# Patient Record
Sex: MALE | Race: WHITE | NOT HISPANIC OR LATINO
[De-identification: names, ages, dates, MRNs, and addresses within clinical notes are randomized per-mention and may not be internally consistent; named-entity substitution may affect disease eponyms.]

---

## 2017-06-13 ENCOUNTER — TRANSCRIPTION ENCOUNTER (OUTPATIENT)
Age: 52
End: 2017-06-13

## 2018-09-26 ENCOUNTER — EMERGENCY (EMERGENCY)
Facility: HOSPITAL | Age: 53
LOS: 0 days | Discharge: HOME | End: 2018-09-26
Attending: EMERGENCY MEDICINE | Admitting: EMERGENCY MEDICINE
Payer: COMMERCIAL

## 2018-09-26 VITALS — HEIGHT: 67 IN | WEIGHT: 149.91 LBS

## 2018-09-26 VITALS
SYSTOLIC BLOOD PRESSURE: 127 MMHG | OXYGEN SATURATION: 98 % | HEART RATE: 64 BPM | RESPIRATION RATE: 18 BRPM | TEMPERATURE: 98 F | DIASTOLIC BLOOD PRESSURE: 79 MMHG

## 2018-09-26 LAB
ALBUMIN SERPL ELPH-MCNC: 4.4 G/DL — SIGNIFICANT CHANGE UP (ref 3.3–5)
ALP SERPL-CCNC: 116 U/L — SIGNIFICANT CHANGE UP (ref 40–120)
ALT FLD-CCNC: 37 U/L — SIGNIFICANT CHANGE UP (ref 12–78)
ANION GAP SERPL CALC-SCNC: 7 MMOL/L — SIGNIFICANT CHANGE UP (ref 5–17)
AST SERPL-CCNC: 25 U/L — SIGNIFICANT CHANGE UP (ref 15–37)
BASOPHILS # BLD AUTO: 0.02 K/UL — SIGNIFICANT CHANGE UP (ref 0–0.2)
BASOPHILS NFR BLD AUTO: 0.4 % — SIGNIFICANT CHANGE UP (ref 0–2)
BILIRUB SERPL-MCNC: 0.5 MG/DL — SIGNIFICANT CHANGE UP (ref 0.2–1.2)
BUN SERPL-MCNC: 26 MG/DL — HIGH (ref 7–23)
CALCIUM SERPL-MCNC: 8.8 MG/DL — SIGNIFICANT CHANGE UP (ref 8.5–10.1)
CHLORIDE SERPL-SCNC: 107 MMOL/L — SIGNIFICANT CHANGE UP (ref 96–108)
CO2 SERPL-SCNC: 25 MMOL/L — SIGNIFICANT CHANGE UP (ref 22–31)
CREAT SERPL-MCNC: 0.94 MG/DL — SIGNIFICANT CHANGE UP (ref 0.5–1.3)
EOSINOPHIL # BLD AUTO: 0.24 K/UL — SIGNIFICANT CHANGE UP (ref 0–0.5)
EOSINOPHIL NFR BLD AUTO: 4.6 % — SIGNIFICANT CHANGE UP (ref 0–6)
GLUCOSE SERPL-MCNC: 98 MG/DL — SIGNIFICANT CHANGE UP (ref 70–99)
HCT VFR BLD CALC: 44.2 % — SIGNIFICANT CHANGE UP (ref 39–50)
HGB BLD-MCNC: 15.6 G/DL — SIGNIFICANT CHANGE UP (ref 13–17)
IMM GRANULOCYTES NFR BLD AUTO: 0.6 % — SIGNIFICANT CHANGE UP (ref 0–1.5)
LYMPHOCYTES # BLD AUTO: 1.49 K/UL — SIGNIFICANT CHANGE UP (ref 1–3.3)
LYMPHOCYTES # BLD AUTO: 28.6 % — SIGNIFICANT CHANGE UP (ref 13–44)
MAGNESIUM SERPL-MCNC: 2.1 MG/DL — SIGNIFICANT CHANGE UP (ref 1.6–2.6)
MCHC RBC-ENTMCNC: 30.2 PG — SIGNIFICANT CHANGE UP (ref 27–34)
MCHC RBC-ENTMCNC: 35.3 GM/DL — SIGNIFICANT CHANGE UP (ref 32–36)
MCV RBC AUTO: 85.7 FL — SIGNIFICANT CHANGE UP (ref 80–100)
MONOCYTES # BLD AUTO: 0.52 K/UL — SIGNIFICANT CHANGE UP (ref 0–0.9)
MONOCYTES NFR BLD AUTO: 10 % — SIGNIFICANT CHANGE UP (ref 2–14)
NEUTROPHILS # BLD AUTO: 2.91 K/UL — SIGNIFICANT CHANGE UP (ref 1.8–7.4)
NEUTROPHILS NFR BLD AUTO: 55.8 % — SIGNIFICANT CHANGE UP (ref 43–77)
NRBC # BLD: 0 /100 WBCS — SIGNIFICANT CHANGE UP (ref 0–0)
PLATELET # BLD AUTO: 246 K/UL — SIGNIFICANT CHANGE UP (ref 150–400)
POTASSIUM SERPL-MCNC: 4 MMOL/L — SIGNIFICANT CHANGE UP (ref 3.5–5.3)
POTASSIUM SERPL-SCNC: 4 MMOL/L — SIGNIFICANT CHANGE UP (ref 3.5–5.3)
PROT SERPL-MCNC: 8.2 GM/DL — SIGNIFICANT CHANGE UP (ref 6–8.3)
RBC # BLD: 5.16 M/UL — SIGNIFICANT CHANGE UP (ref 4.2–5.8)
RBC # FLD: 11.9 % — SIGNIFICANT CHANGE UP (ref 10.3–14.5)
SODIUM SERPL-SCNC: 139 MMOL/L — SIGNIFICANT CHANGE UP (ref 135–145)
TROPONIN I SERPL-MCNC: <0.015 NG/ML — SIGNIFICANT CHANGE UP (ref 0.01–0.04)
WBC # BLD: 5.21 K/UL — SIGNIFICANT CHANGE UP (ref 3.8–10.5)
WBC # FLD AUTO: 5.21 K/UL — SIGNIFICANT CHANGE UP (ref 3.8–10.5)

## 2018-09-26 PROCEDURE — 71046 X-RAY EXAM CHEST 2 VIEWS: CPT | Mod: 26

## 2018-09-26 PROCEDURE — 99285 EMERGENCY DEPT VISIT HI MDM: CPT

## 2018-09-26 PROCEDURE — 93010 ELECTROCARDIOGRAM REPORT: CPT

## 2018-09-26 RX ORDER — SODIUM CHLORIDE 9 MG/ML
1000 INJECTION INTRAMUSCULAR; INTRAVENOUS; SUBCUTANEOUS ONCE
Qty: 0 | Refills: 0 | Status: COMPLETED | OUTPATIENT
Start: 2018-09-26 | End: 2018-09-26

## 2018-09-26 RX ORDER — ASPIRIN/CALCIUM CARB/MAGNESIUM 324 MG
325 TABLET ORAL ONCE
Qty: 0 | Refills: 0 | Status: COMPLETED | OUTPATIENT
Start: 2018-09-26 | End: 2018-09-26

## 2018-09-26 RX ADMIN — SODIUM CHLORIDE 1000 MILLILITER(S): 9 INJECTION INTRAMUSCULAR; INTRAVENOUS; SUBCUTANEOUS at 13:07

## 2018-09-26 RX ADMIN — SODIUM CHLORIDE 1000 MILLILITER(S): 9 INJECTION INTRAMUSCULAR; INTRAVENOUS; SUBCUTANEOUS at 12:07

## 2018-09-26 RX ADMIN — Medication 325 MILLIGRAM(S): at 11:10

## 2018-09-26 NOTE — ED STATDOCS - PROGRESS NOTE DETAILS
52 y/o male presents to the ED c/o left sided tight CP and left arm pain starting at 4am today. Pt states the chest tightness lasted all morning but the pain was for 30 minutes. +SOB that pt's wife states has been going on for a while.  Sx slightly improved. Pt also c/o a rash at the bottom of his right foot that has been going on for a while. Pt reports hx of prior sx "a few times". Denies fever or chills. Pt has not seen PCP in a while. FMhx of heart dz. heart score 3. PE: cardiac: s1-s2 RRR, lungs: CTAB plan: 2 set ce and ekg cxr and reeval. -Nina Noriega PA-C pt reeval and offers no complaints aware of imaigng and lab results awaiting 2nd ce. -Nina Noriega PA-C pt aware of all labs results advised to fu with cardiology and pmd and return to ed if needed, pt agrees with plan. -Nina Noriega PA-C

## 2018-09-26 NOTE — ED ADULT TRIAGE NOTE - CHIEF COMPLAINT QUOTE
Patient comes to ED for chest pain, dizziness and left arm pain since this morning at 330am. pt reports symptoms on and off for 2 years but pain worsened this morning

## 2018-09-26 NOTE — ED ADULT NURSE NOTE - OBJECTIVE STATEMENT
Pt presents to ED c/o CP this morning up until an hour ago with left arm numbness. Pt reports SOB at baseline. Pt reports chest tightness. Pt denies N/V

## 2018-09-26 NOTE — ED ADULT NURSE NOTE - NSIMPLEMENTINTERV_GEN_ALL_ED
Implemented All Universal Safety Interventions:  [place] to call system. Call bell, personal items and telephone within reach. Instruct patient to call for assistance. Room bathroom lighting operational. Non-slip footwear when patient is off stretcher. Physically safe environment: no spills, clutter or unnecessary equipment. Stretcher in lowest position, wheels locked, appropriate side rails in place.

## 2018-09-26 NOTE — ED STATDOCS - OBJECTIVE STATEMENT
52 y/o male presents to the ED c/o left sided tight CP and left arm pain starting at 4am today. Pt states the chest tightness lasted all morning but the pain was for 30 minutes. +SOB that pt's wife states has been going on for a while.  Sx slightly improved. Pt also c/o a rash at the bottom of his right foot that has been going on for a while. Pt reports hx of prior sx "a few times". Denies fever or chills. Pt has not seen PCP in a while. FMhx of heart dz.

## 2018-09-26 NOTE — ED STATDOCS - MEDICAL DECISION MAKING DETAILS
HEART score 3.  EKG nonischemic.  CXR clear.  Troponin x 2 undetectable.  No concerning features for PE, aortic dissection, or any other thoracic emergencies.  Pt feeling better.  Okay for d/c home.  F/u with PCP.

## 2018-09-27 DIAGNOSIS — Z88.0 ALLERGY STATUS TO PENICILLIN: ICD-10-CM

## 2018-09-27 DIAGNOSIS — R07.9 CHEST PAIN, UNSPECIFIED: ICD-10-CM

## 2018-10-01 ENCOUNTER — APPOINTMENT (OUTPATIENT)
Dept: INTERNAL MEDICINE | Facility: CLINIC | Age: 53
End: 2018-10-01
Payer: COMMERCIAL

## 2018-10-01 ENCOUNTER — NON-APPOINTMENT (OUTPATIENT)
Age: 53
End: 2018-10-01

## 2018-10-01 VITALS
WEIGHT: 162.99 LBS | RESPIRATION RATE: 18 BRPM | HEIGHT: 68 IN | BODY MASS INDEX: 24.7 KG/M2 | SYSTOLIC BLOOD PRESSURE: 116 MMHG | HEART RATE: 68 BPM | OXYGEN SATURATION: 97 % | DIASTOLIC BLOOD PRESSURE: 86 MMHG | TEMPERATURE: 98.3 F

## 2018-10-01 PROCEDURE — G0442 ANNUAL ALCOHOL SCREEN 15 MIN: CPT

## 2018-10-01 PROCEDURE — 94010 BREATHING CAPACITY TEST: CPT

## 2018-10-01 PROCEDURE — 99204 OFFICE O/P NEW MOD 45 MIN: CPT | Mod: 25

## 2018-10-01 NOTE — HISTORY OF PRESENT ILLNESS
[FreeTextEntry8] : ER follow up appointment\par \par \par A 53-year-old male with a negative past medical history, was seen recently in the emergency room with chest pain he noted upon waking up. it last about 15 minutes and then he may have had other episodes while he was in the emergency room. He was evaluated there with EKGs labs and chest x-ray which were okay and he was released and is scheduled to see Dr. Boo cardiology for an evaluation. He has no history of prior heart disease. He is not having any more chest pain. He denies palpitations or syncope. He does notice ongoing dyspnea on exertion and he has noted for at least 2 years. He is able to walk 6 blocks and climb one flight of stairs fine. Is not having any coughing, wheezing, sputum production, or hemoptysis. No fever or chills.

## 2018-10-01 NOTE — PHYSICAL EXAM
[No Acute Distress] : no acute distress [Well Nourished] : well nourished [Well Developed] : well developed [Well-Appearing] : well-appearing [Normal Sclera/Conjunctiva] : normal sclera/conjunctiva [PERRL] : pupils equal round and reactive to light [Normal Outer Ear/Nose] : the outer ears and nose were normal in appearance [Normal Oropharynx] : the oropharynx was normal [No JVD] : no jugular venous distention [Supple] : supple [No Lymphadenopathy] : no lymphadenopathy [Thyroid Normal, No Nodules] : the thyroid was normal and there were no nodules present [No Respiratory Distress] : no respiratory distress  [Clear to Auscultation] : lungs were clear to auscultation bilaterally [No Accessory Muscle Use] : no accessory muscle use [Normal Rate] : normal rate  [Regular Rhythm] : with a regular rhythm [Normal S1, S2] : normal S1 and S2 [No Edema] : there was no peripheral edema [No Extremity Clubbing/Cyanosis] : no extremity clubbing/cyanosis [Soft] : abdomen soft [Non Tender] : non-tender [Non-distended] : non-distended [No Masses] : no abdominal mass palpated [No HSM] : no HSM [Normal Bowel Sounds] : normal bowel sounds [Normal Posterior Cervical Nodes] : no posterior cervical lymphadenopathy [Normal Anterior Cervical Nodes] : no anterior cervical lymphadenopathy [Normal Affect] : the affect was normal [Normal Insight/Judgement] : insight and judgment were intact [de-identified] : alert

## 2018-10-01 NOTE — REVIEW OF SYSTEMS
[Fever] : no fever [Chills] : no chills [Chest Pain] : no chest pain [Palpitations] : no palpitations [Wheezing] : no wheezing [Cough] : no cough [Negative] : Heme/Lymph [FreeTextEntry1] : no h.o. DM, thyroid disease, cancer, lymphoma.

## 2018-10-01 NOTE — ASSESSMENT
[FreeTextEntry1] : #1 recent atypical chest pain. Patient denies further episodes since recent ER visit. Will be seeing Dr. Boo, cardiologist, soon for evaluation.\par \par #2 long history of dyspnea on exertion.  patient is able to walk 6 blocks and climb one flight of stairs without symptoms. Spirometry today is normal. Chest x-ray in emergency room was normal. For future full PFT's. \par \par #3 allergic rhinitis. Patient takes Claritin p.r.n.\par \par Return visit in 2 months for CPE. Patient will call or return at anytime, if he is having symptoms or clinical change.\par \par

## 2018-10-01 NOTE — HEALTH RISK ASSESSMENT
[Very Good] : ~his/her~  mood as very good [No falls in past year] : Patient reported no falls in the past year [0] : 2) Feeling down, depressed, or hopeless: Not at all (0) [[id number]] : 0 [Change in mental status noted] : Change in mental status noted [None] : None [# of Members in Household ___] :  household currently consist of [unfilled] member(s) [Employed] : employed [] :  [# Of Children ___] : has [unfilled] children [Sexually Active] : sexually active [Feels Safe at Home] : Feels safe at home [Reports changes in hearing] : Reports no changes in hearing [Reports changes in vision] : Reports no changes in vision [Smoke Detector] : smoke detector [Carbon Monoxide Detector] : carbon monoxide detector [Guns at Home] : no guns at home [Safety elements used in home] : safety elements used in home [Seat Belt] :  uses seat belt [Sunscreen] : uses sunscreen [FreeTextEntry2] :  [] : Yes [de-identified] : occas

## 2018-10-01 NOTE — PHYSICAL EXAM
[No Acute Distress] : no acute distress [Well Nourished] : well nourished [Well Developed] : well developed [Well-Appearing] : well-appearing [Normal Sclera/Conjunctiva] : normal sclera/conjunctiva [PERRL] : pupils equal round and reactive to light [Normal Outer Ear/Nose] : the outer ears and nose were normal in appearance [Normal Oropharynx] : the oropharynx was normal [No JVD] : no jugular venous distention [Supple] : supple [No Lymphadenopathy] : no lymphadenopathy [Thyroid Normal, No Nodules] : the thyroid was normal and there were no nodules present [No Respiratory Distress] : no respiratory distress  [Clear to Auscultation] : lungs were clear to auscultation bilaterally [No Accessory Muscle Use] : no accessory muscle use [Normal Rate] : normal rate  [Regular Rhythm] : with a regular rhythm [Normal S1, S2] : normal S1 and S2 [No Edema] : there was no peripheral edema [No Extremity Clubbing/Cyanosis] : no extremity clubbing/cyanosis [Soft] : abdomen soft [Non Tender] : non-tender [Non-distended] : non-distended [No Masses] : no abdominal mass palpated [No HSM] : no HSM [Normal Bowel Sounds] : normal bowel sounds [Normal Posterior Cervical Nodes] : no posterior cervical lymphadenopathy [Normal Anterior Cervical Nodes] : no anterior cervical lymphadenopathy [Normal Affect] : the affect was normal [Normal Insight/Judgement] : insight and judgment were intact [de-identified] : alert

## 2018-10-01 NOTE — HEALTH RISK ASSESSMENT
[Very Good] : ~his/her~  mood as very good [No falls in past year] : Patient reported no falls in the past year [0] : 2) Feeling down, depressed, or hopeless: Not at all (0) [[id number]] : 0 [Change in mental status noted] : Change in mental status noted [None] : None [# of Members in Household ___] :  household currently consist of [unfilled] member(s) [Employed] : employed [] :  [# Of Children ___] : has [unfilled] children [Sexually Active] : sexually active [Feels Safe at Home] : Feels safe at home [Reports changes in hearing] : Reports no changes in hearing [Reports changes in vision] : Reports no changes in vision [Smoke Detector] : smoke detector [Carbon Monoxide Detector] : carbon monoxide detector [Guns at Home] : no guns at home [Safety elements used in home] : safety elements used in home [Seat Belt] :  uses seat belt [Sunscreen] : uses sunscreen [FreeTextEntry2] :  [] : Yes [de-identified] : occas

## 2018-10-02 ENCOUNTER — APPOINTMENT (OUTPATIENT)
Dept: CARDIOLOGY | Facility: CLINIC | Age: 53
End: 2018-10-02

## 2018-10-05 ENCOUNTER — APPOINTMENT (OUTPATIENT)
Dept: CARDIOLOGY | Facility: CLINIC | Age: 53
End: 2018-10-05
Payer: COMMERCIAL

## 2018-10-05 ENCOUNTER — NON-APPOINTMENT (OUTPATIENT)
Age: 53
End: 2018-10-05

## 2018-10-05 VITALS
OXYGEN SATURATION: 98 % | BODY MASS INDEX: 25.16 KG/M2 | WEIGHT: 166 LBS | HEART RATE: 72 BPM | SYSTOLIC BLOOD PRESSURE: 128 MMHG | DIASTOLIC BLOOD PRESSURE: 80 MMHG | HEIGHT: 68 IN

## 2018-10-05 PROCEDURE — 36415 COLL VENOUS BLD VENIPUNCTURE: CPT

## 2018-10-05 PROCEDURE — 93000 ELECTROCARDIOGRAM COMPLETE: CPT

## 2018-10-05 PROCEDURE — 99205 OFFICE O/P NEW HI 60 MIN: CPT | Mod: 25

## 2018-10-06 LAB
25(OH)D3 SERPL-MCNC: 29.1 NG/ML
ALBUMIN SERPL ELPH-MCNC: 5.1 G/DL
ALP BLD-CCNC: 103 U/L
ALT SERPL-CCNC: 29 U/L
ANION GAP SERPL CALC-SCNC: 13 MMOL/L
AST SERPL-CCNC: 29 U/L
BASOPHILS # BLD AUTO: 0.01 K/UL
BASOPHILS NFR BLD AUTO: 0.2 %
BILIRUB SERPL-MCNC: 0.7 MG/DL
BUN SERPL-MCNC: 20 MG/DL
CALCIUM SERPL-MCNC: 9.6 MG/DL
CHLORIDE SERPL-SCNC: 104 MMOL/L
CHOLEST SERPL-MCNC: 157 MG/DL
CHOLEST/HDLC SERPL: 3.1 RATIO
CO2 SERPL-SCNC: 25 MMOL/L
CREAT SERPL-MCNC: 0.81 MG/DL
EOSINOPHIL # BLD AUTO: 0.25 K/UL
EOSINOPHIL NFR BLD AUTO: 4.3 %
GLUCOSE SERPL-MCNC: 92 MG/DL
HBA1C MFR BLD HPLC: 5.5 %
HCT VFR BLD CALC: 42.1 %
HDLC SERPL-MCNC: 51 MG/DL
HGB BLD-MCNC: 14.1 G/DL
IMM GRANULOCYTES NFR BLD AUTO: 0.2 %
LDLC SERPL CALC-MCNC: 87 MG/DL
LYMPHOCYTES # BLD AUTO: 1.65 K/UL
LYMPHOCYTES NFR BLD AUTO: 28.5 %
MAN DIFF?: NORMAL
MCHC RBC-ENTMCNC: 29.3 PG
MCHC RBC-ENTMCNC: 33.5 GM/DL
MCV RBC AUTO: 87.5 FL
MONOCYTES # BLD AUTO: 0.64 K/UL
MONOCYTES NFR BLD AUTO: 11.1 %
NEUTROPHILS # BLD AUTO: 3.23 K/UL
NEUTROPHILS NFR BLD AUTO: 55.7 %
PLATELET # BLD AUTO: 237 K/UL
POTASSIUM SERPL-SCNC: 4.9 MMOL/L
PROT SERPL-MCNC: 7.5 G/DL
PSA SERPL-MCNC: 0.89 NG/ML
RBC # BLD: 4.81 M/UL
RBC # FLD: 13 %
SODIUM SERPL-SCNC: 142 MMOL/L
TRIGL SERPL-MCNC: 96 MG/DL
TSH SERPL-ACNC: 1.04 UIU/ML
WBC # FLD AUTO: 5.79 K/UL

## 2018-10-15 ENCOUNTER — APPOINTMENT (OUTPATIENT)
Dept: CARDIOLOGY | Facility: CLINIC | Age: 53
End: 2018-10-15
Payer: COMMERCIAL

## 2018-10-15 PROCEDURE — 93015 CV STRESS TEST SUPVJ I&R: CPT

## 2018-10-15 PROCEDURE — 93306 TTE W/DOPPLER COMPLETE: CPT

## 2018-11-05 ENCOUNTER — APPOINTMENT (OUTPATIENT)
Dept: CARDIOLOGY | Facility: CLINIC | Age: 53
End: 2018-11-05
Payer: COMMERCIAL

## 2018-11-05 VITALS
OXYGEN SATURATION: 94 % | BODY MASS INDEX: 25.31 KG/M2 | HEIGHT: 68 IN | DIASTOLIC BLOOD PRESSURE: 76 MMHG | WEIGHT: 167 LBS | HEART RATE: 72 BPM | SYSTOLIC BLOOD PRESSURE: 115 MMHG

## 2018-11-05 PROCEDURE — 93000 ELECTROCARDIOGRAM COMPLETE: CPT

## 2018-11-05 PROCEDURE — 99214 OFFICE O/P EST MOD 30 MIN: CPT | Mod: 25

## 2018-11-05 RX ORDER — ASPIRIN 81 MG
81 TABLET, DELAYED RELEASE (ENTERIC COATED) ORAL DAILY
Refills: 2 | Status: ACTIVE | COMMUNITY

## 2018-11-05 NOTE — HISTORY OF PRESENT ILLNESS
[FreeTextEntry1] : 54 yo male presents after evaluation in ED for chest pain or shortness of breath with exertion. Pt was recently evaluated by Pulmonary/IM. Testing was reviewed. 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

## 2018-11-05 NOTE — DISCUSSION/SUMMARY
[Patient] : the patient [___ Month(s)] : [unfilled] month(s) [FreeTextEntry1] : Testing was reviwed. Bicuspid valve was discussed with patient. Explaination of proboble eventual repair was discussed.

## 2018-11-05 NOTE — PHYSICAL EXAM
[General Appearance - Well Developed] : well developed [Normal Appearance] : normal appearance [Well Groomed] : well groomed [General Appearance - Well Nourished] : well nourished [No Deformities] : no deformities [General Appearance - In No Acute Distress] : no acute distress [Normal Conjunctiva] : the conjunctiva exhibited no abnormalities [Eyelids - No Xanthelasma] : the eyelids demonstrated no xanthelasmas [Normal Oral Mucosa] : normal oral mucosa [No Oral Pallor] : no oral pallor [No Oral Cyanosis] : no oral cyanosis [Normal Jugular Venous A Waves Present] : normal jugular venous A waves present [Normal Jugular Venous V Waves Present] : normal jugular venous V waves present [No Jugular Venous Tran A Waves] : no jugular venous tran A waves [Respiration, Rhythm And Depth] : normal respiratory rhythm and effort [Exaggerated Use Of Accessory Muscles For Inspiration] : no accessory muscle use [Auscultation Breath Sounds / Voice Sounds] : lungs were clear to auscultation bilaterally [Heart Rate And Rhythm] : heart rate and rhythm were normal [Heart Sounds] : normal S1 and S2 [Murmurs] : no murmurs present [Abdomen Soft] : soft [Abdomen Tenderness] : non-tender [Abdomen Mass (___ Cm)] : no abdominal mass palpated [Abnormal Walk] : normal gait [Gait - Sufficient For Exercise Testing] : the gait was sufficient for exercise testing [Nail Clubbing] : no clubbing of the fingernails [Cyanosis, Localized] : no localized cyanosis [Petechial Hemorrhages (___cm)] : no petechial hemorrhages [Skin Color & Pigmentation] : normal skin color and pigmentation [] : no rash [No Venous Stasis] : no venous stasis [Skin Lesions] : no skin lesions [No Skin Ulcers] : no skin ulcer [No Xanthoma] : no  xanthoma was observed [Oriented To Time, Place, And Person] : oriented to person, place, and time [Affect] : the affect was normal [Mood] : the mood was normal [No Anxiety] : not feeling anxious

## 2018-12-10 ENCOUNTER — APPOINTMENT (OUTPATIENT)
Dept: INTERNAL MEDICINE | Facility: CLINIC | Age: 53
End: 2018-12-10
Payer: COMMERCIAL

## 2018-12-10 VITALS
BODY MASS INDEX: 25.01 KG/M2 | HEIGHT: 68 IN | HEART RATE: 70 BPM | DIASTOLIC BLOOD PRESSURE: 84 MMHG | SYSTOLIC BLOOD PRESSURE: 132 MMHG | OXYGEN SATURATION: 98 % | WEIGHT: 165 LBS | RESPIRATION RATE: 18 BRPM | TEMPERATURE: 98.4 F

## 2018-12-10 DIAGNOSIS — M54.31 SCIATICA, RIGHT SIDE: ICD-10-CM

## 2018-12-10 PROCEDURE — ZZZZZ: CPT

## 2018-12-10 PROCEDURE — 94060 EVALUATION OF WHEEZING: CPT

## 2018-12-10 PROCEDURE — G0442 ANNUAL ALCOHOL SCREEN 15 MIN: CPT

## 2018-12-10 PROCEDURE — 94727 GAS DIL/WSHOT DETER LNG VOL: CPT

## 2018-12-10 PROCEDURE — 99214 OFFICE O/P EST MOD 30 MIN: CPT | Mod: 25

## 2018-12-10 PROCEDURE — 94729 DIFFUSING CAPACITY: CPT

## 2018-12-10 NOTE — HISTORY OF PRESENT ILLNESS
[FreeTextEntry1] : RV, dyspnea [de-identified] : This is a 53-year-old  who returns for her following appointment. Has exposure to diesel fumes. He is not having coughing, wheezing, sputum production, hemoptysis. He does notice variable dyspnea. He was able to walk 4 miles recently without stopping or shortness of breath. Sometimes when he bends down he does notice some shortness. Has not had been hospitalized for breath. He is a nonsmoker. He has no history of asthma or COPD. he is not having fever or chills.\par \par He does have some right back and right lower extremity pain. This can be sharp or sometimes a numbness sensation.\par \par Is not having any chest pain, palpitations, or syncope.

## 2018-12-10 NOTE — HEALTH RISK ASSESSMENT
[Smoke Detector] : smoke detector [Carbon Monoxide Detector] : carbon monoxide detector [Safety elements used in home] : safety elements used in home [Seat Belt] :  uses seat belt [Sunscreen] : uses sunscreen [] : No [Guns at Home] : no guns at home

## 2018-12-10 NOTE — COUNSELING
[Healthy eating counseling provided] : healthy eating [Low Fat Diet] : Low fat diet [ - Annual Alcohol Misuse Screening] : Annual Alcohol Misuse Screening

## 2018-12-10 NOTE — REVIEW OF SYSTEMS
[Chills] : no chills [Chest Pain] : no chest pain [Palpitations] : no palpitations [Wheezing] : no wheezing [Cough] : no cough [Abdominal Pain] : no abdominal pain [Nausea] : no nausea [Vomiting] : no vomiting [Dizziness] : no dizziness [Fainting] : no fainting [FreeTextEntry9] : see above

## 2018-12-10 NOTE — ASSESSMENT
[FreeTextEntry1] : #1  53 year-old male with history of variable dyspnea.  He does note this over the last 2-1/2 years. His spirometry and chest x-ray in the past have been normal. he will have a full pulmonary function testing today (WNL).  I recommended CT scan of chest without contrast to evaluate for possible early interstitial lung disease.\par \par #2 low vit D level.  Vit D 1000 u/d. recheck in 6-12 months.\par \par #3 h.o. RLE sciatica type pain. careful lifting and bending. low back exercises.

## 2018-12-10 NOTE — HISTORY OF PRESENT ILLNESS
[FreeTextEntry1] : RV, dyspnea [de-identified] : This is a 53-year-old  who returns for her following appointment. Has exposure to diesel fumes. He is not having coughing, wheezing, sputum production, hemoptysis. He does notice variable dyspnea. He was able to walk 4 miles recently without stopping or shortness of breath. Sometimes when he bends down he does notice some shortness. Has not had been hospitalized for breath. He is a nonsmoker. He has no history of asthma or COPD. he is not having fever or chills.\par \par He does have some right back and right lower extremity pain. This can be sharp or sometimes a numbness sensation.\par \par Is not having any chest pain, palpitations, or syncope.

## 2018-12-10 NOTE — PHYSICAL EXAM
[No Acute Distress] : no acute distress [Well Nourished] : well nourished [Well Developed] : well developed [Well-Appearing] : well-appearing [Normal Sclera/Conjunctiva] : normal sclera/conjunctiva [PERRL] : pupils equal round and reactive to light [Normal Outer Ear/Nose] : the outer ears and nose were normal in appearance [Normal Oropharynx] : the oropharynx was normal [No JVD] : no jugular venous distention [Supple] : supple [No Lymphadenopathy] : no lymphadenopathy [Thyroid Normal, No Nodules] : the thyroid was normal and there were no nodules present [No Respiratory Distress] : no respiratory distress  [Clear to Auscultation] : lungs were clear to auscultation bilaterally [No Accessory Muscle Use] : no accessory muscle use [Normal Rate] : normal rate  [Regular Rhythm] : with a regular rhythm [Normal S1, S2] : normal S1 and S2 [No Murmur] : no murmur heard [No Edema] : there was no peripheral edema [No Extremity Clubbing/Cyanosis] : no extremity clubbing/cyanosis [Soft] : abdomen soft [Non Tender] : non-tender [Non-distended] : non-distended [No HSM] : no HSM [Normal Bowel Sounds] : normal bowel sounds [Normal Anterior Cervical Nodes] : no anterior cervical lymphadenopathy [No Rash] : no rash [No Focal Deficits] : no focal deficits [Normal Affect] : the affect was normal [Normal Insight/Judgement] : insight and judgment were intact [de-identified] : neg SLR bilat [de-identified] : 5/5 ankle flex and ext.  touch gr nl distal LE's.

## 2018-12-10 NOTE — PHYSICAL EXAM
[No Acute Distress] : no acute distress [Well Nourished] : well nourished [Well Developed] : well developed [Well-Appearing] : well-appearing [Normal Sclera/Conjunctiva] : normal sclera/conjunctiva [PERRL] : pupils equal round and reactive to light [Normal Outer Ear/Nose] : the outer ears and nose were normal in appearance [Normal Oropharynx] : the oropharynx was normal [No JVD] : no jugular venous distention [Supple] : supple [No Lymphadenopathy] : no lymphadenopathy [Thyroid Normal, No Nodules] : the thyroid was normal and there were no nodules present [No Respiratory Distress] : no respiratory distress  [Clear to Auscultation] : lungs were clear to auscultation bilaterally [No Accessory Muscle Use] : no accessory muscle use [Normal Rate] : normal rate  [Regular Rhythm] : with a regular rhythm [Normal S1, S2] : normal S1 and S2 [No Murmur] : no murmur heard [No Edema] : there was no peripheral edema [No Extremity Clubbing/Cyanosis] : no extremity clubbing/cyanosis [Soft] : abdomen soft [Non Tender] : non-tender [Non-distended] : non-distended [No HSM] : no HSM [Normal Bowel Sounds] : normal bowel sounds [Normal Anterior Cervical Nodes] : no anterior cervical lymphadenopathy [No Rash] : no rash [No Focal Deficits] : no focal deficits [Normal Affect] : the affect was normal [Normal Insight/Judgement] : insight and judgment were intact [de-identified] : neg SLR bilat [de-identified] : 5/5 ankle flex and ext.  touch gr nl distal LE's.

## 2019-04-07 ENCOUNTER — FORM ENCOUNTER (OUTPATIENT)
Age: 54
End: 2019-04-07

## 2019-04-08 ENCOUNTER — OUTPATIENT (OUTPATIENT)
Dept: OUTPATIENT SERVICES | Facility: HOSPITAL | Age: 54
LOS: 1 days | End: 2019-04-08
Payer: COMMERCIAL

## 2019-04-08 ENCOUNTER — APPOINTMENT (OUTPATIENT)
Dept: CT IMAGING | Facility: CLINIC | Age: 54
End: 2019-04-08
Payer: COMMERCIAL

## 2019-04-08 DIAGNOSIS — Z00.8 ENCOUNTER FOR OTHER GENERAL EXAMINATION: ICD-10-CM

## 2019-04-08 PROCEDURE — 71250 CT THORAX DX C-: CPT

## 2019-04-08 PROCEDURE — 71250 CT THORAX DX C-: CPT | Mod: 26

## 2019-11-04 ENCOUNTER — APPOINTMENT (OUTPATIENT)
Dept: CARDIOLOGY | Facility: CLINIC | Age: 54
End: 2019-11-04

## 2020-04-02 ENCOUNTER — TRANSCRIPTION ENCOUNTER (OUTPATIENT)
Age: 55
End: 2020-04-02

## 2020-11-06 ENCOUNTER — APPOINTMENT (OUTPATIENT)
Dept: INTERNAL MEDICINE | Facility: CLINIC | Age: 55
End: 2020-11-06
Payer: COMMERCIAL

## 2020-11-06 VITALS
WEIGHT: 160 LBS | SYSTOLIC BLOOD PRESSURE: 120 MMHG | DIASTOLIC BLOOD PRESSURE: 82 MMHG | RESPIRATION RATE: 18 BRPM | OXYGEN SATURATION: 99 % | HEART RATE: 78 BPM | BODY MASS INDEX: 24.25 KG/M2 | TEMPERATURE: 98.3 F | HEIGHT: 68 IN

## 2020-11-06 DIAGNOSIS — Z77.098 CONTACT WITH AND (SUSPECTED) EXPOSURE TO OTHER HAZARDOUS, CHIEFLY NONMEDICINAL, CHEMICALS: ICD-10-CM

## 2020-11-06 PROCEDURE — 99072 ADDL SUPL MATRL&STAF TM PHE: CPT

## 2020-11-06 PROCEDURE — 99214 OFFICE O/P EST MOD 30 MIN: CPT | Mod: 25

## 2020-11-06 NOTE — HISTORY OF PRESENT ILLNESS
[FreeTextEntry1] : RV [de-identified] : This is a retrun visit for this 55-year-old male who continues to note to have variable dyspnea.  He is not having any coughing, wheezing, or hemoptysis.  No fever or chills.  He is a non-smoker has no history of asthma. \par \par He tells me he will be seeing his cardiologist next week and does need a referral for this.

## 2020-11-06 NOTE — REVIEW OF SYSTEMS
[Fever] : no fever [Chills] : no chills [Wheezing] : no wheezing [Cough] : no cough [Abdominal Pain] : no abdominal pain [Vomiting] : no vomiting [Negative] : Heme/Lymph [FreeTextEntry6] : see above

## 2020-11-06 NOTE — PHYSICAL EXAM
[No Acute Distress] : no acute distress [Well Nourished] : well nourished [Well Developed] : well developed [Well-Appearing] : well-appearing [Normal Sclera/Conjunctiva] : normal sclera/conjunctiva [Normal Outer Ear/Nose] : the outer ears and nose were normal in appearance [Normal Oropharynx] : the oropharynx was normal [No JVD] : no jugular venous distention [No Lymphadenopathy] : no lymphadenopathy [Supple] : supple [No Respiratory Distress] : no respiratory distress  [No Accessory Muscle Use] : no accessory muscle use [Clear to Auscultation] : lungs were clear to auscultation bilaterally [Normal Rate] : normal rate  [Regular Rhythm] : with a regular rhythm [Normal S1, S2] : normal S1 and S2 [No Edema] : there was no peripheral edema [No Extremity Clubbing/Cyanosis] : no extremity clubbing/cyanosis [Soft] : abdomen soft [Non Tender] : non-tender [Non-distended] : non-distended [No HSM] : no HSM [Normal Bowel Sounds] : normal bowel sounds [Normal Anterior Cervical Nodes] : no anterior cervical lymphadenopathy [No Rash] : no rash [Coordination Grossly Intact] : coordination grossly intact [No Focal Deficits] : no focal deficits [Normal Gait] : normal gait [Normal Affect] : the affect was normal [Normal Insight/Judgement] : insight and judgment were intact

## 2020-11-06 NOTE — ASSESSMENT
[FreeTextEntry1] : #1  Continues to have occasional episodes of dyspnea.  This seems to be variable.  He will be following with his cardiologist, Dr. Boo next week.\par \par #2 HM.  Pt refusing to have influenza vaccination.

## 2020-11-15 NOTE — ED ADULT NURSE NOTE - NSFALLRSKOUTCOME_ED_ALL_ED
JODI DUBOSE    PLV 1EAS 106 D1    Patient is a 69y old  Male who presents with a chief complaint of ALTERED MENTAL STATUS  AND LACTICEMIA (15 Nov 2020 07:59)       Allergies    No Known Allergies    Intolerances        HPI:  70 y/o Male  from [facility] with medical  hx of Afib, HTN ,HLD ,DM ,Paranoid personality disorder, Alzheimer's dementia, sent in to ED  for combative behaviour and AMS x 1 day.  Patient was tested  +Covid yesterday 11/09 .  Pt is apparently AAOx3 at baseline.  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 Pt wants the person responsible for sending him to the ED to be fired. Pt denies any SOB, cough, GI/ symptoms. Covid test came back negative but patient found to have lactate serum elevated 3.1 ,iv hydration given Admitted for septic workup and evaluation,send blood and urine cx,serial lactate levels,monitor vitals closley,ivfs hydration,monitor urine output and renal profile,id and pulm consults called to determine need in antibacterial tx Patient developed severe agitation and combativeness in er later ,requiring  several ativan ,haldol injections .Placed on constant observation CT BRAIN -NAD .PSYCHIATRY AND NEUROLOGY consults requested .Palliative care consult requested ,to discuss advance directives and complete MOLST  (10 Nov 2020 18:10)      PAST MEDICAL & SURGICAL HISTORY:  Paranoid personality disorder    Anxiety    Dementia    Alzheimer disease    Hypertension    Hyperlipidemia    Diabetes mellitus    Afib    DM (diabetes mellitus)    DM (diabetes mellitus)        FAMILY HISTORY:        MEDICATIONS   acetaminophen   Tablet .. 650 milliGRAM(s) Oral every 6 hours PRN  aspirin 325 milliGRAM(s) Oral daily  atorvastatin 40 milliGRAM(s) Oral at bedtime  dextrose 40% Gel 15 Gram(s) Oral once  dextrose 5%. 1000 milliLiter(s) IV Continuous <Continuous>  dextrose 5%. 1000 milliLiter(s) IV Continuous <Continuous>  dextrose 50% Injectable 25 Gram(s) IV Push once  dextrose 50% Injectable 12.5 Gram(s) IV Push once  dextrose 50% Injectable 25 Gram(s) IV Push once  diltiazem    milliGRAM(s) Oral daily  donepezil 10 milliGRAM(s) Oral at bedtime  famotidine    Tablet 20 milliGRAM(s) Oral daily  glucagon  Injectable 1 milliGRAM(s) IntraMuscular once  heparin   Injectable 5000 Unit(s) SubCutaneous every 12 hours  insulin lispro (ADMELOG) corrective regimen sliding scale   SubCutaneous three times a day before meals  loratadine 10 milliGRAM(s) Oral daily  LORazepam   Injectable 0.5 milliGRAM(s) IV Push every 6 hours PRN  melatonin 5 milliGRAM(s) Oral at bedtime PRN  memantine 5 milliGRAM(s) Oral two times a day  metoprolol tartrate 25 milliGRAM(s) Oral two times a day  OLANZapine Disintegrating Tablet 10 milliGRAM(s) Oral three times a day  OLANZapine Injectable 5 milliGRAM(s) IntraMuscular every 6 hours PRN      Vital Signs Last 24 Hrs  T(C): 36.5 (15 Nov 2020 05:23), Max: 37.3 (14 Nov 2020 20:00)  T(F): 97.7 (15 Nov 2020 05:23), Max: 99.1 (14 Nov 2020 20:00)  HR: 107 (15 Nov 2020 05:23) (71 - 107)  BP: 128/86 (15 Nov 2020 05:23) (128/86 - 156/69)  BP(mean): --  RR: 17 (15 Nov 2020 05:23) (16 - 17)  SpO2: 96% (15 Nov 2020 05:23) (95% - 96%)            LABS:                        12.3   6.89  )-----------( 172      ( 14 Nov 2020 09:34 )             34.9     11-14    144  |  112<H>  |  8   ----------------------------<  172<H>  3.2<L>   |  25  |  0.51    Ca    8.2<L>      14 Nov 2020 09:34                WBC:  WBC Count: 6.89 K/uL (11-14 @ 09:34)  WBC Count: 4.94 K/uL (11-12 @ 08:50)      MICROBIOLOGY:  RECENT CULTURES:  11-12 .Blood Blood XXXX XXXX   No growth to date.    11-10 .Urine Clean Catch (Midstream) XXXX XXXX   No growth    11-10 .Blood Blood-Peripheral Blood Culture PCR   Growth in anaerobic bottle: Gram Positive Cocci in Clusters   Growth in anaerobic bottle: Staphylococcus hominis  Coag Negative Staphylococcus  Single set isolate, possible contaminant. Contact  Microbiology if susceptibility testing clinically  indicated.  "Due to technical problems, Proteus sp. will Not be reported as part of  the BCID panel until further notice"  ***Blood Panel PCR results on this specimen are available  approximately 3 hours after the Gram stain result.***  Gram stain, PCR, and/or culture results may not always  correspond due to difference in methodologies.  ************************************************************  This PCR assay was performed using [employer].  The following targets are tested for: Enterococcus,  vancomycin resistant enterococci, Listeria monocytogenes,  coagulase negative staphylococci, S. aureus,  methicillin resistant S. aureus, Streptococcus agalactiae  (Group B), S. pneumoniae, S. pyogenes (Group A),  Acinetobacter baumannii, Enterobacter cloacae, E. coli,  Klebsiella oxytoca, K. pneumoniae, Proteus sp.,  Serratia marcescens, Haemophilus influenzae,  Neisseria meningitidis, Pseudomonas aeruginosa, Candida  albicans, C. glabrata, C krusei, C parapsilosis,  C. tropicalis and the KPC resistance gene.                    Sodium:  Sodium, Serum: 144 mmol/L (11-14 @ 09:34)  Sodium, Serum: 144 mmol/L (11-12 @ 08:50)      0.51 mg/dL 11-14 @ 09:34  0.65 mg/dL 11-12 @ 08:50      Hemoglobin:  Hemoglobin: 12.3 g/dL (11-14 @ 09:34)  Hemoglobin: 12.1 g/dL (11-12 @ 08:50)      Platelets: Platelet Count - Automated: 172 K/uL (11-14 @ 09:34)  Platelet Count - Automated: 178 K/uL (11-12 @ 08:50)              RADIOLOGY & ADDITIONAL STUDIES:   Universal Safety Interventions

## 2020-11-16 ENCOUNTER — APPOINTMENT (OUTPATIENT)
Dept: CARDIOLOGY | Facility: CLINIC | Age: 55
End: 2020-11-16
Payer: COMMERCIAL

## 2020-11-16 ENCOUNTER — NON-APPOINTMENT (OUTPATIENT)
Age: 55
End: 2020-11-16

## 2020-11-16 VITALS
HEIGHT: 68 IN | WEIGHT: 163 LBS | DIASTOLIC BLOOD PRESSURE: 79 MMHG | BODY MASS INDEX: 24.71 KG/M2 | HEART RATE: 69 BPM | OXYGEN SATURATION: 100 % | SYSTOLIC BLOOD PRESSURE: 141 MMHG

## 2020-11-16 DIAGNOSIS — I35.9 NONRHEUMATIC AORTIC VALVE DISORDER, UNSPECIFIED: ICD-10-CM

## 2020-11-16 DIAGNOSIS — Z87.898 PERSONAL HISTORY OF OTHER SPECIFIED CONDITIONS: ICD-10-CM

## 2020-11-16 DIAGNOSIS — R06.00 DYSPNEA, UNSPECIFIED: ICD-10-CM

## 2020-11-16 PROCEDURE — 99072 ADDL SUPL MATRL&STAF TM PHE: CPT

## 2020-11-16 PROCEDURE — 93000 ELECTROCARDIOGRAM COMPLETE: CPT

## 2020-11-16 PROCEDURE — 99215 OFFICE O/P EST HI 40 MIN: CPT

## 2020-11-16 RX ORDER — MULTIVIT-MIN/FOLIC/VIT K/LYCOP 400-300MCG
50 MCG TABLET ORAL
Qty: 90 | Refills: 3 | Status: ACTIVE | COMMUNITY

## 2020-11-16 NOTE — HISTORY OF PRESENT ILLNESS
[FreeTextEntry1] : 56 yo male presents for ANDUJAR. Pt has had worsening ANDUJAR according to his wife. Pt reports occasional chest pressure. Pt is ambulating for exercise. Pt denies palpitations.

## 2020-11-16 NOTE — REASON FOR VISIT
[Follow-Up - Clinic] : a clinic follow-up of [Aortic Stenosis] : aortic stenosis [Chest Pain] : chest pain [Dyspnea] : dyspnea

## 2020-11-16 NOTE — DISCUSSION/SUMMARY
[FreeTextEntry1] : ANDUJAR may be multifactorial however given his Echo evidence of a bicuspid AV we will do an Echo.

## 2020-11-23 LAB
25(OH)D3 SERPL-MCNC: 43.5 NG/ML
ALBUMIN SERPL ELPH-MCNC: 4.8 G/DL
ALP BLD-CCNC: 98 U/L
ALT SERPL-CCNC: 25 U/L
ANION GAP SERPL CALC-SCNC: 10 MMOL/L
APPEARANCE: CLEAR
AST SERPL-CCNC: 25 U/L
BACTERIA: NEGATIVE
BASOPHILS # BLD AUTO: 0.03 K/UL
BASOPHILS NFR BLD AUTO: 0.6 %
BILIRUB SERPL-MCNC: 0.8 MG/DL
BILIRUBIN URINE: NEGATIVE
BLOOD URINE: NEGATIVE
BUN SERPL-MCNC: 22 MG/DL
CALCIUM SERPL-MCNC: 9.3 MG/DL
CHLORIDE SERPL-SCNC: 104 MMOL/L
CHOLEST SERPL-MCNC: 169 MG/DL
CO2 SERPL-SCNC: 26 MMOL/L
COLOR: YELLOW
CREAT SERPL-MCNC: 0.93 MG/DL
EOSINOPHIL # BLD AUTO: 0.15 K/UL
EOSINOPHIL NFR BLD AUTO: 3.1 %
ESTIMATED AVERAGE GLUCOSE: 105 MG/DL
GLUCOSE QUALITATIVE U: NEGATIVE
GLUCOSE SERPL-MCNC: 94 MG/DL
HBA1C MFR BLD HPLC: 5.3 %
HCT VFR BLD CALC: 43.5 %
HDLC SERPL-MCNC: 55 MG/DL
HGB BLD-MCNC: 14.5 G/DL
HYALINE CASTS: 0 /LPF
IMM GRANULOCYTES NFR BLD AUTO: 0.2 %
KETONES URINE: NEGATIVE
LDLC SERPL CALC-MCNC: 104 MG/DL
LEUKOCYTE ESTERASE URINE: NEGATIVE
LYMPHOCYTES # BLD AUTO: 1.49 K/UL
LYMPHOCYTES NFR BLD AUTO: 30.8 %
MAN DIFF?: NORMAL
MCHC RBC-ENTMCNC: 29.8 PG
MCHC RBC-ENTMCNC: 33.3 GM/DL
MCV RBC AUTO: 89.5 FL
MICROSCOPIC-UA: NORMAL
MONOCYTES # BLD AUTO: 0.55 K/UL
MONOCYTES NFR BLD AUTO: 11.4 %
NEUTROPHILS # BLD AUTO: 2.61 K/UL
NEUTROPHILS NFR BLD AUTO: 53.9 %
NITRITE URINE: NEGATIVE
NONHDLC SERPL-MCNC: 114 MG/DL
PH URINE: 6
PLATELET # BLD AUTO: 235 K/UL
POTASSIUM SERPL-SCNC: 4.6 MMOL/L
PROT SERPL-MCNC: 7.1 G/DL
PROTEIN URINE: NORMAL
PSA SERPL-MCNC: 1.02 NG/ML
RBC # BLD: 4.86 M/UL
RBC # FLD: 12.2 %
RED BLOOD CELLS URINE: 2 /HPF
SODIUM SERPL-SCNC: 139 MMOL/L
SPECIFIC GRAVITY URINE: 1.03
SQUAMOUS EPITHELIAL CELLS: 0 /HPF
TRIGL SERPL-MCNC: 49 MG/DL
TSH SERPL-ACNC: 0.73 UIU/ML
UROBILINOGEN URINE: NORMAL
WBC # FLD AUTO: 4.84 K/UL
WHITE BLOOD CELLS URINE: 1 /HPF

## 2020-12-28 ENCOUNTER — APPOINTMENT (OUTPATIENT)
Dept: CARDIOLOGY | Facility: CLINIC | Age: 55
End: 2020-12-28
Payer: COMMERCIAL

## 2020-12-28 PROCEDURE — 93306 TTE W/DOPPLER COMPLETE: CPT

## 2021-03-19 ENCOUNTER — NON-APPOINTMENT (OUTPATIENT)
Age: 56
End: 2021-03-19

## 2021-03-19 ENCOUNTER — APPOINTMENT (OUTPATIENT)
Dept: INTERNAL MEDICINE | Facility: CLINIC | Age: 56
End: 2021-03-19
Payer: COMMERCIAL

## 2021-03-19 VITALS
HEART RATE: 80 BPM | SYSTOLIC BLOOD PRESSURE: 130 MMHG | HEIGHT: 68 IN | BODY MASS INDEX: 23.19 KG/M2 | DIASTOLIC BLOOD PRESSURE: 88 MMHG | TEMPERATURE: 98 F | OXYGEN SATURATION: 97 % | WEIGHT: 153 LBS

## 2021-03-19 DIAGNOSIS — R21 RASH AND OTHER NONSPECIFIC SKIN ERUPTION: ICD-10-CM

## 2021-03-19 DIAGNOSIS — B35.4 TINEA CORPORIS: ICD-10-CM

## 2021-03-19 DIAGNOSIS — L30.9 DERMATITIS, UNSPECIFIED: ICD-10-CM

## 2021-03-19 PROCEDURE — 99214 OFFICE O/P EST MOD 30 MIN: CPT

## 2021-03-19 PROCEDURE — 99072 ADDL SUPL MATRL&STAF TM PHE: CPT

## 2021-03-19 NOTE — REVIEW OF SYSTEMS
[Skin Rash] : skin rash [Negative] : Psychiatric [Fever] : no fever [Chills] : no chills [Fatigue] : no fatigue [Itching] : no itching [de-identified] : see HPI

## 2021-03-19 NOTE — HISTORY OF PRESENT ILLNESS
[FreeTextEntry8] : Pt presents  to the office today with complaints of scaly reddened rash on chest and upper back. He has no prior h/ of skin conditions. He noticed  it 10 days ago, is not itchy, sometimes burns. Extends to both sides of his upper chest and upper back. Some areas are circular. He denies any new products. \par He does not take any medications. No fever, chills, joint swelling, chest pain, SOB. \par

## 2021-03-19 NOTE — PHYSICAL EXAM
[No Acute Distress] : no acute distress [Well Nourished] : well nourished [Well Developed] : well developed [No Respiratory Distress] : no respiratory distress  [No Accessory Muscle Use] : no accessory muscle use [Clear to Auscultation] : lungs were clear to auscultation bilaterally [Normal Rate] : normal rate  [Regular Rhythm] : with a regular rhythm [Normal S1, S2] : normal S1 and S2 [Coordination Grossly Intact] : coordination grossly intact [No Focal Deficits] : no focal deficits [Normal Gait] : normal gait [Normal Affect] : the affect was normal [Alert and Oriented x3] : oriented to person, place, and time [Normal Insight/Judgement] : insight and judgment were intact [de-identified] : scaly round patches on upper chest , back

## 2021-11-04 ENCOUNTER — RESULT REVIEW (OUTPATIENT)
Age: 56
End: 2021-11-04

## 2021-11-04 ENCOUNTER — INPATIENT (INPATIENT)
Facility: HOSPITAL | Age: 56
LOS: 0 days | Discharge: HOME | DRG: 352 | End: 2021-11-04
Attending: SURGERY | Admitting: SURGERY
Payer: COMMERCIAL

## 2021-11-04 ENCOUNTER — TRANSCRIPTION ENCOUNTER (OUTPATIENT)
Age: 56
End: 2021-11-04

## 2021-11-04 VITALS
HEART RATE: 62 BPM | OXYGEN SATURATION: 100 % | RESPIRATION RATE: 16 BRPM | SYSTOLIC BLOOD PRESSURE: 126 MMHG | DIASTOLIC BLOOD PRESSURE: 74 MMHG | TEMPERATURE: 97 F

## 2021-11-04 VITALS — WEIGHT: 149.91 LBS | HEIGHT: 67 IN

## 2021-11-04 DIAGNOSIS — Z78.9 OTHER SPECIFIED HEALTH STATUS: Chronic | ICD-10-CM

## 2021-11-04 DIAGNOSIS — K40.90 UNILATERAL INGUINAL HERNIA, WITHOUT OBSTRUCTION OR GANGRENE, NOT SPECIFIED AS RECURRENT: ICD-10-CM

## 2021-11-04 LAB
ABO RH CONFIRMATION: SIGNIFICANT CHANGE UP
ALBUMIN SERPL ELPH-MCNC: 4.5 G/DL — SIGNIFICANT CHANGE UP (ref 3.3–5)
ALP SERPL-CCNC: 98 U/L — SIGNIFICANT CHANGE UP (ref 40–120)
ALT FLD-CCNC: 40 U/L — SIGNIFICANT CHANGE UP (ref 12–78)
ANION GAP SERPL CALC-SCNC: 7 MMOL/L — SIGNIFICANT CHANGE UP (ref 5–17)
APTT BLD: 37.4 SEC — HIGH (ref 27.5–35.5)
AST SERPL-CCNC: 27 U/L — SIGNIFICANT CHANGE UP (ref 15–37)
BASOPHILS # BLD AUTO: 0.02 K/UL — SIGNIFICANT CHANGE UP (ref 0–0.2)
BASOPHILS NFR BLD AUTO: 0.5 % — SIGNIFICANT CHANGE UP (ref 0–2)
BILIRUB SERPL-MCNC: 1.1 MG/DL — SIGNIFICANT CHANGE UP (ref 0.2–1.2)
BLD GP AB SCN SERPL QL: SIGNIFICANT CHANGE UP
BUN SERPL-MCNC: 23 MG/DL — SIGNIFICANT CHANGE UP (ref 7–23)
CALCIUM SERPL-MCNC: 9.3 MG/DL — SIGNIFICANT CHANGE UP (ref 8.5–10.1)
CHLORIDE SERPL-SCNC: 107 MMOL/L — SIGNIFICANT CHANGE UP (ref 96–108)
CO2 SERPL-SCNC: 25 MMOL/L — SIGNIFICANT CHANGE UP (ref 22–31)
CREAT SERPL-MCNC: 0.98 MG/DL — SIGNIFICANT CHANGE UP (ref 0.5–1.3)
EOSINOPHIL # BLD AUTO: 0.17 K/UL — SIGNIFICANT CHANGE UP (ref 0–0.5)
EOSINOPHIL NFR BLD AUTO: 3.9 % — SIGNIFICANT CHANGE UP (ref 0–6)
GLUCOSE SERPL-MCNC: 89 MG/DL — SIGNIFICANT CHANGE UP (ref 70–99)
HCT VFR BLD CALC: 44.4 % — SIGNIFICANT CHANGE UP (ref 39–50)
HCV AB S/CO SERPL IA: 0.17 S/CO — SIGNIFICANT CHANGE UP (ref 0–0.99)
HCV AB SERPL-IMP: SIGNIFICANT CHANGE UP
HGB BLD-MCNC: 15.4 G/DL — SIGNIFICANT CHANGE UP (ref 13–17)
IMM GRANULOCYTES NFR BLD AUTO: 0.2 % — SIGNIFICANT CHANGE UP (ref 0–1.5)
INR BLD: 1 RATIO — SIGNIFICANT CHANGE UP (ref 0.88–1.16)
LYMPHOCYTES # BLD AUTO: 1.2 K/UL — SIGNIFICANT CHANGE UP (ref 1–3.3)
LYMPHOCYTES # BLD AUTO: 27.8 % — SIGNIFICANT CHANGE UP (ref 13–44)
MCHC RBC-ENTMCNC: 30.5 PG — SIGNIFICANT CHANGE UP (ref 27–34)
MCHC RBC-ENTMCNC: 34.7 GM/DL — SIGNIFICANT CHANGE UP (ref 32–36)
MCV RBC AUTO: 87.9 FL — SIGNIFICANT CHANGE UP (ref 80–100)
MONOCYTES # BLD AUTO: 0.43 K/UL — SIGNIFICANT CHANGE UP (ref 0–0.9)
MONOCYTES NFR BLD AUTO: 10 % — SIGNIFICANT CHANGE UP (ref 2–14)
NEUTROPHILS # BLD AUTO: 2.49 K/UL — SIGNIFICANT CHANGE UP (ref 1.8–7.4)
NEUTROPHILS NFR BLD AUTO: 57.6 % — SIGNIFICANT CHANGE UP (ref 43–77)
PLATELET # BLD AUTO: 230 K/UL — SIGNIFICANT CHANGE UP (ref 150–400)
POTASSIUM SERPL-MCNC: 4.1 MMOL/L — SIGNIFICANT CHANGE UP (ref 3.5–5.3)
POTASSIUM SERPL-SCNC: 4.1 MMOL/L — SIGNIFICANT CHANGE UP (ref 3.5–5.3)
PROT SERPL-MCNC: 8.2 GM/DL — SIGNIFICANT CHANGE UP (ref 6–8.3)
PROTHROM AB SERPL-ACNC: 11.7 SEC — SIGNIFICANT CHANGE UP (ref 10.6–13.6)
RBC # BLD: 5.05 M/UL — SIGNIFICANT CHANGE UP (ref 4.2–5.8)
RBC # FLD: 12 % — SIGNIFICANT CHANGE UP (ref 10.3–14.5)
SARS-COV-2 RNA SPEC QL NAA+PROBE: SIGNIFICANT CHANGE UP
SODIUM SERPL-SCNC: 139 MMOL/L — SIGNIFICANT CHANGE UP (ref 135–145)
WBC # BLD: 4.32 K/UL — SIGNIFICANT CHANGE UP (ref 3.8–10.5)
WBC # FLD AUTO: 4.32 K/UL — SIGNIFICANT CHANGE UP (ref 3.8–10.5)

## 2021-11-04 PROCEDURE — 36415 COLL VENOUS BLD VENIPUNCTURE: CPT

## 2021-11-04 PROCEDURE — 86901 BLOOD TYPING SEROLOGIC RH(D): CPT

## 2021-11-04 PROCEDURE — 85610 PROTHROMBIN TIME: CPT

## 2021-11-04 PROCEDURE — 88304 TISSUE EXAM BY PATHOLOGIST: CPT

## 2021-11-04 PROCEDURE — 86900 BLOOD TYPING SEROLOGIC ABO: CPT

## 2021-11-04 PROCEDURE — 80053 COMPREHEN METABOLIC PANEL: CPT

## 2021-11-04 PROCEDURE — 99285 EMERGENCY DEPT VISIT HI MDM: CPT

## 2021-11-04 PROCEDURE — 87635 SARS-COV-2 COVID-19 AMP PRB: CPT

## 2021-11-04 PROCEDURE — 86850 RBC ANTIBODY SCREEN: CPT

## 2021-11-04 PROCEDURE — 93010 ELECTROCARDIOGRAM REPORT: CPT

## 2021-11-04 PROCEDURE — 86803 HEPATITIS C AB TEST: CPT

## 2021-11-04 PROCEDURE — 93005 ELECTROCARDIOGRAM TRACING: CPT

## 2021-11-04 PROCEDURE — 85730 THROMBOPLASTIN TIME PARTIAL: CPT

## 2021-11-04 PROCEDURE — C1781: CPT

## 2021-11-04 PROCEDURE — 85025 COMPLETE CBC W/AUTO DIFF WBC: CPT

## 2021-11-04 PROCEDURE — 88304 TISSUE EXAM BY PATHOLOGIST: CPT | Mod: 26

## 2021-11-04 RX ORDER — OXYCODONE HYDROCHLORIDE 5 MG/1
5 TABLET ORAL ONCE
Refills: 0 | Status: DISCONTINUED | OUTPATIENT
Start: 2021-11-04 | End: 2021-11-04

## 2021-11-04 RX ORDER — ONDANSETRON 8 MG/1
4 TABLET, FILM COATED ORAL ONCE
Refills: 0 | Status: DISCONTINUED | OUTPATIENT
Start: 2021-11-04 | End: 2021-11-04

## 2021-11-04 RX ORDER — ACETAMINOPHEN 500 MG
1000 TABLET ORAL ONCE
Refills: 0 | Status: DISCONTINUED | OUTPATIENT
Start: 2021-11-04 | End: 2021-11-04

## 2021-11-04 RX ORDER — SODIUM CHLORIDE 9 MG/ML
1000 INJECTION INTRAMUSCULAR; INTRAVENOUS; SUBCUTANEOUS
Refills: 0 | Status: DISCONTINUED | OUTPATIENT
Start: 2021-11-04 | End: 2021-11-04

## 2021-11-04 RX ORDER — FENTANYL CITRATE 50 UG/ML
50 INJECTION INTRAVENOUS
Refills: 0 | Status: DISCONTINUED | OUTPATIENT
Start: 2021-11-04 | End: 2021-11-04

## 2021-11-04 RX ORDER — SODIUM CHLORIDE 9 MG/ML
1000 INJECTION, SOLUTION INTRAVENOUS
Refills: 0 | Status: DISCONTINUED | OUTPATIENT
Start: 2021-11-04 | End: 2021-11-04

## 2021-11-04 RX ORDER — SODIUM CHLORIDE 9 MG/ML
1000 INJECTION INTRAMUSCULAR; INTRAVENOUS; SUBCUTANEOUS ONCE
Refills: 0 | Status: COMPLETED | OUTPATIENT
Start: 2021-11-04 | End: 2021-11-04

## 2021-11-04 RX ORDER — IBUPROFEN 200 MG
800 TABLET ORAL EVERY 8 HOURS
Refills: 0 | Status: DISCONTINUED | OUTPATIENT
Start: 2021-11-04 | End: 2021-11-04

## 2021-11-04 RX ORDER — ACETAMINOPHEN 500 MG
650 TABLET ORAL EVERY 6 HOURS
Refills: 0 | Status: DISCONTINUED | OUTPATIENT
Start: 2021-11-04 | End: 2021-11-04

## 2021-11-04 RX ADMIN — SODIUM CHLORIDE 1000 MILLILITER(S): 9 INJECTION INTRAMUSCULAR; INTRAVENOUS; SUBCUTANEOUS at 08:25

## 2021-11-04 NOTE — ASU DISCHARGE PLAN (ADULT/PEDIATRIC) - CARE PROVIDER_API CALL
Cristian Diamond)  Surgery; Surgical Critical Care  [address], [address]  [place] [address]  Phone: [phone]  Fax: [phone]  Follow Up Time: 1 week

## 2021-11-04 NOTE — ED ADULT TRIAGE NOTE - CHIEF COMPLAINT QUOTE
Pt presents to er with complaints of right groin pain, states he was instructed by physician to take care of hernia and never did, pt now states it protrudes more at this time with discomfort, denies urinary obstruction/fevers.

## 2021-11-04 NOTE — H&P ADULT - NSHPLABSRESULTS_GEN_ALL_CORE
LABS:                        15.4   4.32  )-----------( 230      ( 04 Nov 2021 07:29 )             44.4           rest pending

## 2021-11-04 NOTE — H&P ADULT - NSHPPHYSICALEXAM_GEN_ALL_CORE
PHYSICAL EXAM:  GENERAL: NAD, lying in bed comfortably  HEAD:  Atraumatic, Normocephalic  EYES: EOMI, PERRLA, conjunctiva and sclera clear  ENT: Moist mucous membranes  NECK: Supple, No JVD  CHEST/LUNG: Clear to auscultation bilaterally; No rales, rhonchi, wheezing, or rubs. Unlabored respirations  HEART: Regular rate and rhythm; No murmurs, rubs, or gallops  ABDOMEN: Bowel sounds present; Soft, Nontender, Nondistended. No hepatomegaly. RIght inguinal hernia palpable, reducible, tender to palpation.  EXTREMITIES:  2+ Peripheral Pulses, brisk capillary refill. No clubbing, cyanosis, or edema  NERVOUS SYSTEM:  Alert & Oriented X3, speech clear. No deficits   MSK: FROM all 4 extremities, full and equal strength  SKIN: No rashes or lesions

## 2021-11-04 NOTE — DISCHARGE NOTE PROVIDER - CARE PROVIDER_API CALL
Cristian Diamond)  Surgery; Surgical Critical Care  [address] [facility] [address] [address], [address]  [place] [address]  Phone: [phone]  Fax: [phone]  Follow Up Time: 1 week

## 2021-11-04 NOTE — ED PROVIDER NOTE - CLINICAL SUMMARY MEDICAL DECISION MAKING FREE TEXT BOX
Pt with known inguinal hernia.  No e/o obstruction, strangulation or incarceration at this time.  Becoming larger and more painful.  Dr. Diamond aware of patient.  To be admitted for operative repair.

## 2021-11-04 NOTE — H&P ADULT - HISTORY OF PRESENT ILLNESS
Patient is a 56 yr male that presents with a symptomatic right inguinal hernia that he had for the past year. Pt works as a  and states it has been getting bigger and more painful for this past month. Pt denies any fever, chill, CP, SOB, N/V. Pt presented to ED today to excruciating pain located in right groin and would like to have it fix as it is affecting his daily activities.    PMH: denies  PSH: denies  All: PCN, unsure of reaction, presumes a rash  SH: denies smoking, drug use or ETOH abuse

## 2021-11-04 NOTE — BRIEF OPERATIVE NOTE - NSICDXBRIEFPROCEDURE_GEN_ALL_CORE_FT
PROCEDURES:  Repair, hernia, inguinal, open, using mesh, adult 04-Nov-2021 14:13:53  Aurelia Cisse

## 2021-11-04 NOTE — PHARMACOTHERAPY INTERVENTION NOTE - COMMENTS
Medication history complete. Reviewed medications with patient and confirmed with Dr. Worrell. Patient was taking aspirin 81mg and vitamin D daily up until 2 weeks ago.

## 2021-11-04 NOTE — ED PROVIDER NOTE - PHYSICAL EXAMINATION
Constitutional: NAD, well appearing  HEENT: no rhinorrhea  CVS:  RRR, no m/r/g  Resp:  CTAB  GI: soft, ntnd, +right inguinal hernia, reduces on minimal palpation  MSK:  no restriction to rom, full ROM to all extremities  Neuro:  A&Ox3, 5/5 strength to all extremities,  SILT to all extremities  Skin: no rash  psych: clear thought content  Heme/lymph:  No LAD

## 2021-11-04 NOTE — ED PROVIDER NOTE - OBJECTIVE STATEMENT
56 y M no sig pmh presenting with worsening inguinal hernia over the last few days.  Has had hernia for a while, but has recently been increasing in size and become more painful when hernia protrudes more.  no dysuria/hematuria.  no f/c/n/v/d.  Still passing gas and bowel movements.

## 2021-11-04 NOTE — H&P ADULT - ASSESSMENT
56 yr male with symptomatic right inguinal hernia    Plan  admit to Dr Diamond service  plan for open right inguinal hernia repair today  pain control  IVF hydration  NPO  dispo: plan for early discharge after surgery    Plan discussed with surgical attending, Dr Diamond

## 2021-11-04 NOTE — DISCHARGE NOTE PROVIDER - NSDCFUADDINST_GEN_ALL_CORE_FT
please take pain medications as needed over the counter  please restrain from strenuous activities and heavy lifting more than 10-lb for 6 weeks  please do not take steri-strips off as they will fall off on their own  take shower tomorrow with running water/soap

## 2021-11-04 NOTE — H&P ADULT - ATTENDING COMMENTS
Patient was seen and examined, modifications and corrections made  I agree with findings and plan      Plan   Right inguinal hernia repair with mesh  All material risks and benefits to surgery and no surgery were discussed with patient.  These include, but not exclusive to,  infection, hematoma, bleeding, abscess; organ/nerve/vascular injury; scarring, deformity and/or death.  All questions were answered.

## 2021-11-04 NOTE — DISCHARGE NOTE PROVIDER - HOSPITAL COURSE
55yo M presents w/ symptomatic right inguinal hernia. Patient was brought to the OR and both indirect and direct inguinal hernia appreciated and were repaired with mesh. Uneventful postoperative course and patient recovered appropriately

## 2021-11-09 DIAGNOSIS — K40.30 UNILATERAL INGUINAL HERNIA, WITH OBSTRUCTION, WITHOUT GANGRENE, NOT SPECIFIED AS RECURRENT: ICD-10-CM

## 2021-11-09 DIAGNOSIS — D17.6 BENIGN LIPOMATOUS NEOPLASM OF SPERMATIC CORD: ICD-10-CM

## 2021-11-09 DIAGNOSIS — Z88.0 ALLERGY STATUS TO PENICILLIN: ICD-10-CM

## 2021-12-03 ENCOUNTER — OUTPATIENT (OUTPATIENT)
Dept: OUTPATIENT SERVICES | Facility: HOSPITAL | Age: 56
LOS: 1 days | End: 2021-12-03
Payer: COMMERCIAL

## 2021-12-03 ENCOUNTER — APPOINTMENT (OUTPATIENT)
Dept: RADIOLOGY | Facility: CLINIC | Age: 56
End: 2021-12-03
Payer: COMMERCIAL

## 2021-12-03 ENCOUNTER — APPOINTMENT (OUTPATIENT)
Dept: NEUROSURGERY | Facility: CLINIC | Age: 56
End: 2021-12-03
Payer: COMMERCIAL

## 2021-12-03 VITALS
OXYGEN SATURATION: 99 % | WEIGHT: 155 LBS | BODY MASS INDEX: 23.49 KG/M2 | SYSTOLIC BLOOD PRESSURE: 145 MMHG | DIASTOLIC BLOOD PRESSURE: 80 MMHG | HEART RATE: 81 BPM | HEIGHT: 68 IN | TEMPERATURE: 97.3 F

## 2021-12-03 DIAGNOSIS — M54.40 LUMBAGO WITH SCIATICA, UNSPECIFIED SIDE: ICD-10-CM

## 2021-12-03 DIAGNOSIS — Z78.9 OTHER SPECIFIED HEALTH STATUS: Chronic | ICD-10-CM

## 2021-12-03 PROCEDURE — 72110 X-RAY EXAM L-2 SPINE 4/>VWS: CPT | Mod: 26

## 2021-12-03 PROCEDURE — 99203 OFFICE O/P NEW LOW 30 MIN: CPT

## 2021-12-03 PROCEDURE — 72110 X-RAY EXAM L-2 SPINE 4/>VWS: CPT

## 2021-12-03 NOTE — CONSULT LETTER
[Dear  ___] : Dear  [unfilled], [Courtesy Letter:] : I had the pleasure of seeing your patient, [unfilled], in my office today. [Sincerely,] : Sincerely, [FreeTextEntry2] : Omid Puente MD\par [address] [facility] [address]\[place] [address] [FreeTextEntry1] : Michael Onofre is a 56-year-old  who presents today with lumbar symptoms.  Patient reports symptoms started approximately 2 years ago without any specific event.  He reports constant low back centralized shooting stabbing pain with radiation into posterior right leg and lateral and bottom of right foot.  He reports numbness and tingling to all digits of his right foot.  He denies any lower extremity weakness.  He denies saddle anesthesia or bowel or bladder dysfunction.  He reports mechanical pain at times.  He denies any difficulties with walking coordination issues.  Patient reports Advil, Tylenol and ice do not provide him with any relief.  Patient is alert and oriented.  No distress noted.  Strength to bilateral legs 5/5.  Negative clonus.  Sensation to light touch to lower extremities equal and normal.  Negative straight leg test.  Reflexes to lower extremity present and equal.  No difficulties with walking noted.  I provided patient with a prescription for an x-ray and MRI of the lumbar spine.  Patient is declining physical therapy and pain management at this time.  We will follow-up in office once imaging is available.  Patient is aware to call with any further questions or concerns or with any new or worsening symptoms. [FreeTextEntry3] : Naida Luu, MSN, FNP-BC\par Nurse Practitioner\par Neurosurgery\par [address] [place] [address], [address]nd floor \[place] [address] \par Office: [phone] \par Fax: [phone]\par \par

## 2021-12-27 ENCOUNTER — NON-APPOINTMENT (OUTPATIENT)
Age: 56
End: 2021-12-27

## 2022-01-07 ENCOUNTER — OUTPATIENT (OUTPATIENT)
Dept: OUTPATIENT SERVICES | Facility: HOSPITAL | Age: 57
LOS: 1 days | End: 2022-01-07
Payer: COMMERCIAL

## 2022-01-07 ENCOUNTER — APPOINTMENT (OUTPATIENT)
Dept: MRI IMAGING | Facility: CLINIC | Age: 57
End: 2022-01-07
Payer: COMMERCIAL

## 2022-01-07 DIAGNOSIS — Z78.9 OTHER SPECIFIED HEALTH STATUS: Chronic | ICD-10-CM

## 2022-01-07 DIAGNOSIS — Z00.8 ENCOUNTER FOR OTHER GENERAL EXAMINATION: ICD-10-CM

## 2022-01-07 DIAGNOSIS — M54.40 LUMBAGO WITH SCIATICA, UNSPECIFIED SIDE: ICD-10-CM

## 2022-01-07 PROCEDURE — 72148 MRI LUMBAR SPINE W/O DYE: CPT | Mod: 26

## 2022-01-07 PROCEDURE — 72148 MRI LUMBAR SPINE W/O DYE: CPT

## 2022-02-09 ENCOUNTER — APPOINTMENT (OUTPATIENT)
Dept: NEUROSURGERY | Facility: CLINIC | Age: 57
End: 2022-02-09

## 2022-04-14 ENCOUNTER — APPOINTMENT (OUTPATIENT)
Dept: INTERNAL MEDICINE | Facility: CLINIC | Age: 57
End: 2022-04-14
Payer: COMMERCIAL

## 2022-04-14 VITALS
BODY MASS INDEX: 22.73 KG/M2 | SYSTOLIC BLOOD PRESSURE: 123 MMHG | DIASTOLIC BLOOD PRESSURE: 80 MMHG | HEIGHT: 68 IN | HEART RATE: 82 BPM | WEIGHT: 150 LBS | TEMPERATURE: 98.6 F | RESPIRATION RATE: 16 BRPM | OXYGEN SATURATION: 97 %

## 2022-04-14 DIAGNOSIS — Z00.00 ENCOUNTER FOR GENERAL ADULT MEDICAL EXAMINATION W/OUT ABNORMAL FINDINGS: ICD-10-CM

## 2022-04-14 DIAGNOSIS — K13.0 DISEASES OF LIPS: ICD-10-CM

## 2022-04-14 PROCEDURE — 99396 PREV VISIT EST AGE 40-64: CPT | Mod: 25

## 2022-04-14 PROCEDURE — G0444 DEPRESSION SCREEN ANNUAL: CPT | Mod: 59

## 2022-04-14 RX ORDER — FLUCONAZOLE 100 MG/1
100 TABLET ORAL
Qty: 7 | Refills: 0 | Status: DISCONTINUED | COMMUNITY
Start: 2021-03-19 | End: 2022-04-14

## 2022-04-14 RX ORDER — TRIAMCINOLONE ACETONIDE 1 MG/G
0.1 CREAM TOPICAL TWICE DAILY
Qty: 1 | Refills: 1 | Status: DISCONTINUED | COMMUNITY
Start: 2021-03-19 | End: 2022-04-14

## 2022-04-14 NOTE — HEALTH RISK ASSESSMENT
[Never] : Never [Yes] : Yes [Monthly or less (1 pt)] : Monthly or less (1 point) [1 or 2 (0 pts)] : 1 or 2 (0 points) [Never (0 pts)] : Never (0 points) [No] : In the past 12 months have you used drugs other than those required for medical reasons? No [0] : 2) Feeling down, depressed, or hopeless: Not at all (0) [PHQ-2 Negative - No further assessment needed] : PHQ-2 Negative - No further assessment needed [[id number]] : 0

## 2022-04-14 NOTE — ASSESSMENT
[FreeTextEntry1] : #1 .  For fasting blood work.  Stool occult blood test.  Also recommended see gastroenterology for screening colonoscopy.  \par \par #2  Bicuspid aortic valve.  Mildly dilated ascending aorta, 4.1 cm.  Continue following with cardiology.\par \par #3  Small lip lesion, lower lip, on left.  Refer to dermatology for evaluation.\par \par

## 2022-04-14 NOTE — PHYSICAL EXAM
[No Acute Distress] : no acute distress [Well Nourished] : well nourished [Well Developed] : well developed [Well-Appearing] : well-appearing [Normal Sclera/Conjunctiva] : normal sclera/conjunctiva [PERRL] : pupils equal round and reactive to light [Normal Outer Ear/Nose] : the outer ears and nose were normal in appearance [Normal Oropharynx] : the oropharynx was normal [No JVD] : no jugular venous distention [No Lymphadenopathy] : no lymphadenopathy [Supple] : supple [No Respiratory Distress] : no respiratory distress  [No Accessory Muscle Use] : no accessory muscle use [Clear to Auscultation] : lungs were clear to auscultation bilaterally [Normal Rate] : normal rate  [Regular Rhythm] : with a regular rhythm [Normal S1, S2] : normal S1 and S2 [No Edema] : there was no peripheral edema [No Extremity Clubbing/Cyanosis] : no extremity clubbing/cyanosis [Soft] : abdomen soft [Non Tender] : non-tender [Non-distended] : non-distended [No HSM] : no HSM [Normal Bowel Sounds] : normal bowel sounds [Normal Anterior Cervical Nodes] : no anterior cervical lymphadenopathy [No Rash] : no rash [Coordination Grossly Intact] : coordination grossly intact [No Focal Deficits] : no focal deficits [Normal Gait] : normal gait [Normal Affect] : the affect was normal [Normal Insight/Judgement] : insight and judgment were intact [de-identified] : small dark lesion lower lip on left

## 2022-04-14 NOTE — HISTORY OF PRESENT ILLNESS
[FreeTextEntry1] : CPE [de-identified] : This is a 57-year-old male who returns for CPE.  He works in maintenance for trucks.  He is not having recent chest pain, palpitations, coughing, or fever.  \par \par He never smoked cigarettes.  He has an infrequent alcohol drink.\par \par He received his second Pfizer COVID vaccination in October 2021.\par \par He has noted a small dark lesion in the left lower lip area.  He is unsure of the duration.  Referring him to see Dr. Jones, dermatology.

## 2022-04-30 ENCOUNTER — LABORATORY RESULT (OUTPATIENT)
Age: 57
End: 2022-04-30

## 2022-05-02 ENCOUNTER — NON-APPOINTMENT (OUTPATIENT)
Age: 57
End: 2022-05-02

## 2022-05-02 LAB
25(OH)D3 SERPL-MCNC: 45.1 NG/ML
ALBUMIN SERPL ELPH-MCNC: 5 G/DL
ALP BLD-CCNC: 90 U/L
ALT SERPL-CCNC: 22 U/L
ANION GAP SERPL CALC-SCNC: 15 MMOL/L
AST SERPL-CCNC: 25 U/L
BASOPHILS # BLD AUTO: 0.02 K/UL
BASOPHILS NFR BLD AUTO: 0.5 %
BILIRUB SERPL-MCNC: 1 MG/DL
BUN SERPL-MCNC: 22 MG/DL
CALCIUM SERPL-MCNC: 9.7 MG/DL
CHLORIDE SERPL-SCNC: 102 MMOL/L
CHOLEST SERPL-MCNC: 174 MG/DL
CO2 SERPL-SCNC: 24 MMOL/L
CREAT SERPL-MCNC: 0.96 MG/DL
EGFR: 92 ML/MIN/1.73M2
EOSINOPHIL # BLD AUTO: 0.2 K/UL
EOSINOPHIL NFR BLD AUTO: 4.7 %
ESTIMATED AVERAGE GLUCOSE: 108 MG/DL
FERRITIN SERPL-MCNC: 384 NG/ML
FOLATE SERPL-MCNC: 16.2 NG/ML
GLUCOSE SERPL-MCNC: 93 MG/DL
HBA1C MFR BLD HPLC: 5.4 %
HCT VFR BLD CALC: 43.9 %
HDLC SERPL-MCNC: 61 MG/DL
HGB BLD-MCNC: 14.5 G/DL
IMM GRANULOCYTES NFR BLD AUTO: 0.2 %
IRON SATN MFR SERPL: 38 %
IRON SERPL-MCNC: 127 UG/DL
LDLC SERPL CALC-MCNC: 102 MG/DL
LYMPHOCYTES # BLD AUTO: 1.41 K/UL
LYMPHOCYTES NFR BLD AUTO: 33.4 %
MAN DIFF?: NORMAL
MCHC RBC-ENTMCNC: 29.8 PG
MCHC RBC-ENTMCNC: 33 GM/DL
MCV RBC AUTO: 90.1 FL
MONOCYTES # BLD AUTO: 0.43 K/UL
MONOCYTES NFR BLD AUTO: 10.2 %
NEUTROPHILS # BLD AUTO: 2.15 K/UL
NEUTROPHILS NFR BLD AUTO: 51 %
NONHDLC SERPL-MCNC: 113 MG/DL
PLATELET # BLD AUTO: 247 K/UL
POTASSIUM SERPL-SCNC: 4.4 MMOL/L
PROT SERPL-MCNC: 7.2 G/DL
RBC # BLD: 4.87 M/UL
RBC # FLD: 12.5 %
SODIUM SERPL-SCNC: 141 MMOL/L
TIBC SERPL-MCNC: 330 UG/DL
TRIGL SERPL-MCNC: 54 MG/DL
TSH SERPL-ACNC: 1.08 UIU/ML
UIBC SERPL-MCNC: 203 UG/DL
VIT B12 SERPL-MCNC: 1075 PG/ML
WBC # FLD AUTO: 4.22 K/UL

## 2023-04-18 ENCOUNTER — APPOINTMENT (OUTPATIENT)
Dept: INTERNAL MEDICINE | Facility: CLINIC | Age: 58
End: 2023-04-18
Payer: COMMERCIAL

## 2023-04-18 VITALS
TEMPERATURE: 97.9 F | BODY MASS INDEX: 22.13 KG/M2 | DIASTOLIC BLOOD PRESSURE: 82 MMHG | WEIGHT: 146 LBS | SYSTOLIC BLOOD PRESSURE: 124 MMHG | OXYGEN SATURATION: 97 % | HEART RATE: 64 BPM | RESPIRATION RATE: 16 BRPM | HEIGHT: 68 IN

## 2023-04-18 DIAGNOSIS — Z00.00 ENCOUNTER FOR GENERAL ADULT MEDICAL EXAMINATION W/OUT ABNORMAL FINDINGS: ICD-10-CM

## 2023-04-18 DIAGNOSIS — J30.2 OTHER SEASONAL ALLERGIC RHINITIS: ICD-10-CM

## 2023-04-18 DIAGNOSIS — Q23.1 CONGENITAL INSUFFICIENCY OF AORTIC VALVE: ICD-10-CM

## 2023-04-18 PROCEDURE — 99396 PREV VISIT EST AGE 40-64: CPT

## 2023-04-18 NOTE — ASSESSMENT
[FreeTextEntry1] : #1 .  Patient will have fasting blood work, fecal immunology occult blood testing.  Counseled on healthy lifestyle measures, continuing with healthy diet.  As noted above, patient refusing immunizations.  For return visit in 1 year for annual CPE. \par \par #2  Bicuspid aortic valve.  Needed ascending aorta measures 4 cm on CT scan.  Patient will continue to follow with cardiology, Dr. Raymond.\par \par #3  Allergic rhinitis.  Patient uses Claritin as needed.  We will add Flonase as needed as necessary.

## 2023-04-18 NOTE — PHYSICAL EXAM
[No Acute Distress] : no acute distress [Well Nourished] : well nourished [Well Developed] : well developed [Normal Sclera/Conjunctiva] : normal sclera/conjunctiva [PERRL] : pupils equal round and reactive to light [Normal Outer Ear/Nose] : the outer ears and nose were normal in appearance [Normal Oropharynx] : the oropharynx was normal [No JVD] : no jugular venous distention [No Lymphadenopathy] : no lymphadenopathy [Supple] : supple [No Respiratory Distress] : no respiratory distress  [No Accessory Muscle Use] : no accessory muscle use [Clear to Auscultation] : lungs were clear to auscultation bilaterally [Normal Rate] : normal rate  [Regular Rhythm] : with a regular rhythm [Normal S1, S2] : normal S1 and S2 [No Carotid Bruits] : no carotid bruits [No Edema] : there was no peripheral edema [No Extremity Clubbing/Cyanosis] : no extremity clubbing/cyanosis [Soft] : abdomen soft [Non Tender] : non-tender [Non-distended] : non-distended [No HSM] : no HSM [Normal Bowel Sounds] : normal bowel sounds [Normal Anterior Cervical Nodes] : no anterior cervical lymphadenopathy [No Rash] : no rash [Coordination Grossly Intact] : coordination grossly intact [No Focal Deficits] : no focal deficits [Normal Gait] : normal gait [Normal Affect] : the affect was normal [Normal Insight/Judgement] : insight and judgment were intact

## 2023-04-18 NOTE — HEALTH RISK ASSESSMENT
[Yes] : Yes [Monthly or less (1 pt)] : Monthly or less (1 point) [1 or 2 (0 pts)] : 1 or 2 (0 points) [Never (0 pts)] : Never (0 points) [No] : In the past 12 months have you used drugs other than those required for medical reasons? No [0] : 2) Feeling down, depressed, or hopeless: Not at all (0) [Never] : Never [PHQ-2 Negative - No further assessment needed] : PHQ-2 Negative - No further assessment needed [[id number]] : 0 [Employed] : employed

## 2023-04-18 NOTE — HISTORY OF PRESENT ILLNESS
[FreeTextEntry1] : CPE [de-identified] : This is a return visit for this 58-year-old male comes in for his CPE.  Continues to refuse Tdap, flu, and Shingrix vaccinations.  He does get exercise at work, tells me that he walked 9.2 miles today while at work.  He has allergy symptoms in the springtime and takes Claritin as needed.  Uses Flonase for his second medicine as needed.  \par \par He never smoked cigarettes.  He has an occasional alcohol drink.  He does not vape.\par \par He has not had colonoscopy and will have fecal immunology occult blood testing with his fasting blood work.

## 2023-04-25 LAB
25(OH)D3 SERPL-MCNC: 40.1 NG/ML
ALBUMIN SERPL ELPH-MCNC: 4.9 G/DL
ALP BLD-CCNC: 86 U/L
ALT SERPL-CCNC: 23 U/L
ANION GAP SERPL CALC-SCNC: 11 MMOL/L
AST SERPL-CCNC: 24 U/L
BASOPHILS # BLD AUTO: 0.03 K/UL
BASOPHILS NFR BLD AUTO: 0.6 %
BILIRUB SERPL-MCNC: 0.8 MG/DL
BUN SERPL-MCNC: 30 MG/DL
CALCIUM SERPL-MCNC: 9.6 MG/DL
CHLORIDE SERPL-SCNC: 106 MMOL/L
CHOLEST SERPL-MCNC: 180 MG/DL
CO2 SERPL-SCNC: 26 MMOL/L
CREAT SERPL-MCNC: 1.01 MG/DL
EGFR: 86 ML/MIN/1.73M2
EOSINOPHIL # BLD AUTO: 0.25 K/UL
EOSINOPHIL NFR BLD AUTO: 5.1 %
ESTIMATED AVERAGE GLUCOSE: 108 MG/DL
FERRITIN SERPL-MCNC: 366 NG/ML
FOLATE SERPL-MCNC: 16.9 NG/ML
GLUCOSE SERPL-MCNC: 105 MG/DL
HBA1C MFR BLD HPLC: 5.4 %
HCT VFR BLD CALC: 43.2 %
HDLC SERPL-MCNC: 60 MG/DL
HGB BLD-MCNC: 14.4 G/DL
IMM GRANULOCYTES NFR BLD AUTO: 0.2 %
IRON SATN MFR SERPL: 33 %
IRON SERPL-MCNC: 108 UG/DL
LDLC SERPL CALC-MCNC: 108 MG/DL
LYMPHOCYTES # BLD AUTO: 1.48 K/UL
LYMPHOCYTES NFR BLD AUTO: 30.2 %
MAN DIFF?: NORMAL
MCHC RBC-ENTMCNC: 30.1 PG
MCHC RBC-ENTMCNC: 33.3 GM/DL
MCV RBC AUTO: 90.2 FL
MONOCYTES # BLD AUTO: 0.59 K/UL
MONOCYTES NFR BLD AUTO: 12 %
NEUTROPHILS # BLD AUTO: 2.54 K/UL
NEUTROPHILS NFR BLD AUTO: 51.9 %
NONHDLC SERPL-MCNC: 120 MG/DL
PLATELET # BLD AUTO: 253 K/UL
POTASSIUM SERPL-SCNC: 4.8 MMOL/L
PROT SERPL-MCNC: 6.9 G/DL
RBC # BLD: 4.79 M/UL
RBC # FLD: 12.3 %
SODIUM SERPL-SCNC: 142 MMOL/L
TIBC SERPL-MCNC: 326 UG/DL
TRIGL SERPL-MCNC: 58 MG/DL
TSH SERPL-ACNC: 1.34 UIU/ML
UIBC SERPL-MCNC: 218 UG/DL
VIT B12 SERPL-MCNC: 1017 PG/ML
WBC # FLD AUTO: 4.9 K/UL

## 2023-05-09 LAB — HEMOCCULT STL QL IA: NEGATIVE

## 2024-04-23 ENCOUNTER — RESULT CHARGE (OUTPATIENT)
Age: 59
End: 2024-04-23

## 2024-04-24 ENCOUNTER — NON-APPOINTMENT (OUTPATIENT)
Age: 59
End: 2024-04-24

## 2024-04-24 ENCOUNTER — APPOINTMENT (OUTPATIENT)
Dept: INTERNAL MEDICINE | Facility: CLINIC | Age: 59
End: 2024-04-24
Payer: COMMERCIAL

## 2024-04-24 VITALS
TEMPERATURE: 98.7 F | WEIGHT: 142 LBS | HEIGHT: 66 IN | HEART RATE: 84 BPM | SYSTOLIC BLOOD PRESSURE: 135 MMHG | OXYGEN SATURATION: 97 % | DIASTOLIC BLOOD PRESSURE: 76 MMHG | RESPIRATION RATE: 16 BRPM | BODY MASS INDEX: 22.82 KG/M2

## 2024-04-24 DIAGNOSIS — J30.2 OTHER SEASONAL ALLERGIC RHINITIS: ICD-10-CM

## 2024-04-24 DIAGNOSIS — R53.83 OTHER FATIGUE: ICD-10-CM

## 2024-04-24 DIAGNOSIS — R52 PAIN, UNSPECIFIED: ICD-10-CM

## 2024-04-24 DIAGNOSIS — B34.9 VIRAL INFECTION, UNSPECIFIED: ICD-10-CM

## 2024-04-24 DIAGNOSIS — R61 GENERALIZED HYPERHIDROSIS: ICD-10-CM

## 2024-04-24 PROCEDURE — ZZZZZ: CPT

## 2024-04-24 PROCEDURE — 99214 OFFICE O/P EST MOD 30 MIN: CPT | Mod: 25

## 2024-04-24 PROCEDURE — 94060 EVALUATION OF WHEEZING: CPT

## 2024-04-24 PROCEDURE — 94727 GAS DIL/WSHOT DETER LNG VOL: CPT

## 2024-04-24 PROCEDURE — 94729 DIFFUSING CAPACITY: CPT

## 2024-04-24 PROCEDURE — 93000 ELECTROCARDIOGRAM COMPLETE: CPT

## 2024-04-24 PROCEDURE — 87811 SARS-COV-2 COVID19 W/OPTIC: CPT | Mod: QW

## 2024-04-24 PROCEDURE — 87804 INFLUENZA ASSAY W/OPTIC: CPT | Mod: QW

## 2024-04-24 NOTE — HISTORY OF PRESENT ILLNESS
[FreeTextEntry1] : RV [de-identified] : This is a 59-year-old male with history of allergic rhinitis who in for his 1 year visit.  2 nights ago he awoke feeling cold and sweaty this lasted on and off during the night and then later yesterday improved, and he is feeling better.  He also had tiredness which is improved and body aches which are improved.  He did not have headache, sore throat, runny nose, cough, sputum production, burning on urination, pain on urination, pus in the urine, nausea, vomiting, or diarrhea, chest pain, or palpitations.  Someone was sick recently in his office and out with a stomach bug.

## 2024-04-24 NOTE — DATA REVIEWED
[FreeTextEntry1] : Full pulmonary function testing today is within normal limits with an FEV1 of 3.49 or 110% predicted.  TLC is normal.  Diffusing capacity is normal.  Oxygen saturation is 97% on room air.  EKG today shows heart rate 79, normal sinus rhythm, incomplete right bundle branch block.  Rapid Flu and rapid COVID tests in office today are both negative.

## 2024-04-24 NOTE — PHYSICAL EXAM
[No Acute Distress] : no acute distress [Well Nourished] : well nourished [Well Developed] : well developed [Well-Appearing] : well-appearing [Normal Sclera/Conjunctiva] : normal sclera/conjunctiva [Normal Outer Ear/Nose] : the outer ears and nose were normal in appearance [Normal Oropharynx] : the oropharynx was normal [No JVD] : no jugular venous distention [No Lymphadenopathy] : no lymphadenopathy [Supple] : supple [No Respiratory Distress] : no respiratory distress  [No Accessory Muscle Use] : no accessory muscle use [Clear to Auscultation] : lungs were clear to auscultation bilaterally [Normal Rate] : normal rate  [Regular Rhythm] : with a regular rhythm [Normal S1, S2] : normal S1 and S2 [No Abdominal Bruit] : a ~M bruit was not heard ~T in the abdomen [No Edema] : there was no peripheral edema [No Extremity Clubbing/Cyanosis] : no extremity clubbing/cyanosis [Soft] : abdomen soft [Non Tender] : non-tender [Non-distended] : non-distended [No HSM] : no HSM [Normal Bowel Sounds] : normal bowel sounds [Normal Anterior Cervical Nodes] : no anterior cervical lymphadenopathy [No CVA Tenderness] : no CVA  tenderness [No Spinal Tenderness] : no spinal tenderness [Coordination Grossly Intact] : coordination grossly intact [No Focal Deficits] : no focal deficits [Normal Gait] : normal gait [Normal Affect] : the affect was normal [Normal Insight/Judgement] : insight and judgment were intact

## 2024-04-24 NOTE — ASSESSMENT
[FreeTextEntry1] : #1  History of feeling cold and sweaty 2 nights ago, this is now improved.  He also has tiredness which is improved and body aches which are improved.  Consider viral illness.  Patient will treat supportively, ensure fluids, take Tylenol as needed.  Call or return at any time should there be any increase in symptoms or clinical change.  Patient knows to go to the emergency room should he have any emergent symptoms.  #2  Allergic rhinitis.   # HM.  Michael will have fasting blood work, TFT's.

## 2024-04-26 LAB
FLUAV SPEC QL CULT: NORMAL
FLUBV AG SPEC QL IA: NORMAL
SARS-COV-2 AG RESP QL IA.RAPID: NEGATIVE

## 2024-04-29 LAB
25(OH)D3 SERPL-MCNC: 31.6 NG/ML
ALBUMIN SERPL ELPH-MCNC: 4.4 G/DL
ALP BLD-CCNC: 89 U/L
ALT SERPL-CCNC: 46 U/L
ANION GAP SERPL CALC-SCNC: 12 MMOL/L
AST SERPL-CCNC: 38 U/L
BASOPHILS # BLD AUTO: 0.04 K/UL
BASOPHILS NFR BLD AUTO: 0.8 %
BILIRUB SERPL-MCNC: 0.5 MG/DL
BUN SERPL-MCNC: 20 MG/DL
CALCIUM SERPL-MCNC: 9.4 MG/DL
CHLORIDE SERPL-SCNC: 101 MMOL/L
CHOLEST SERPL-MCNC: 148 MG/DL
CO2 SERPL-SCNC: 26 MMOL/L
CREAT SERPL-MCNC: 0.93 MG/DL
EGFR: 95 ML/MIN/1.73M2
EOSINOPHIL # BLD AUTO: 0.22 K/UL
EOSINOPHIL NFR BLD AUTO: 4.4 %
ESTIMATED AVERAGE GLUCOSE: 105 MG/DL
FERRITIN SERPL-MCNC: 599 NG/ML
FOLATE SERPL-MCNC: 11.1 NG/ML
GLUCOSE SERPL-MCNC: 100 MG/DL
HBA1C MFR BLD HPLC: 5.3 %
HCT VFR BLD CALC: 44.1 %
HDLC SERPL-MCNC: 41 MG/DL
HGB BLD-MCNC: 14.8 G/DL
IMM GRANULOCYTES NFR BLD AUTO: 0.4 %
IRON SERPL-MCNC: 100 UG/DL
LDLC SERPL CALC-MCNC: 87 MG/DL
LYMPHOCYTES # BLD AUTO: 1.49 K/UL
LYMPHOCYTES NFR BLD AUTO: 29.6 %
MAN DIFF?: NORMAL
MCHC RBC-ENTMCNC: 30.3 PG
MCHC RBC-ENTMCNC: 33.6 GM/DL
MCV RBC AUTO: 90.2 FL
MONOCYTES # BLD AUTO: 0.71 K/UL
MONOCYTES NFR BLD AUTO: 14.1 %
NEUTROPHILS # BLD AUTO: 2.56 K/UL
NEUTROPHILS NFR BLD AUTO: 50.7 %
NONHDLC SERPL-MCNC: 107 MG/DL
PLATELET # BLD AUTO: 215 K/UL
POTASSIUM SERPL-SCNC: 4.8 MMOL/L
PROT SERPL-MCNC: 7 G/DL
RBC # BLD: 4.89 M/UL
RBC # FLD: 12 %
SODIUM SERPL-SCNC: 139 MMOL/L
T3FREE SERPL-MCNC: 2.89 PG/ML
T4 SERPL-MCNC: 10.1 UG/DL
TRIGL SERPL-MCNC: 105 MG/DL
TSH SERPL-ACNC: 1.08 UIU/ML
VIT B12 SERPL-MCNC: 866 PG/ML
WBC # FLD AUTO: 5.04 K/UL

## 2024-05-01 LAB — PSA SERPL-MCNC: 0.71 NG/ML

## 2025-03-31 NOTE — ASSESSMENT
Patient Eye Exam      SUBJECTIVE  HPI    45 years old male new patient is complaining of recurrent intermittent scleral bump OS>OD for the last weeks. He also reports some discomfort, mostly when wearing CL's. He uses Pataday PRN OU. He denies any eye surgery. He thinks it is related to allergies.            PMH:   There is no previous medical history on file.      Social:  reports that he has never smoked. He has never used smokeless tobacco..      PROBLEM LIST:  Patient Active Problem List   Diagnosis    Anxiety disorder due to general medical condition    Conjunctivochalasis of both eyes    Allergic conjunctivitis, bilateral    Pinguecula of both eyes        History reviewed. No pertinent surgical history.      EYE MEDICATIONS:  Current Outpatient Medications (Includes Only Ophthalmic agents)   Medication Sig Dispense Refill   None        ALLERGIES:   Allergen Reactions    Seasonal Other (See Comments)         EXAM:   Base Eye Exam       Visual Acuity (Snellen - Linear)         Right Left    Dist cc 20/20 20/25 -2      Correction: Glasses              Tonometry (Applanation, 7:44 AM)         Right Left    Pressure 20 20              Pupils         Pupils    Right PERRL    Left PERRL              Visual Fields         Left Right     Full Full              Extraocular Movement         Right Left     Full, Ortho Full, Ortho              Neuro/Psych       Oriented x3: Yes    Mood/Affect: Normal              Dilation       Both eyes: 1% Mydriacyl / 2.5% Neosynephrine  @ 7:16 AM                  Slit Lamp and Fundus Exam       External Exam         Right Left    External Normal Normal              Slit Lamp Exam         Right Left    Lids/Lashes Normal Normal    Conjunctiva/Sclera conjunctivochalasis, pinguecula temporal, papillae conjunctivochalasis, pinguecula temporal, papillae    Cornea Clear Clear    Anterior Chamber Deep and quiet Deep and quiet    Iris Round and regular Round and regular    Lens Clear Clear       [FreeTextEntry1] : rash on chest ,etiology unclear , likely fungal \par Diflucan 100 mg po daily #7\par Pt has steroid cream , will not use concurrently with Diflucan \par f/up with Derm MD if sx's persist          Fundus Exam         Right Left    Vitreous Clear Clear    Disc Flat, pink with a healthy rim Flat, pink with a healthy rim    C/D Ratio Vertical 0.25 0.25    C/D Ratio Horizontal 0.25 0.25    Macula Healthy with sharp foveal reflex Healthy with sharp foveal reflex    Vessels Healthy with normal Artery-Vein ratio Healthy with normal Artery-Vein ratio    Periphery Flat, healthy, without tears or detachments Flat, healthy, without tears or detachments                  Refraction       Wearing Rx         Sphere Cylinder Axis    Right -2.50 +1.50 010    Left -2.25 +1.25 160      Age: 2 years    Type: Distance Single vision                          ASSESSMENT/PLAN:  1. Conjunctivochalasis of both eyes  May be aggravated by allergies. Try allergy drops (see plan #2).  Consider referral to anterior segment in future if bothersome.     2. Allergic conjunctivitis, bilateral  Recommend cool compresses. Start over the counter allergy drop such as Pataday or Alaway once daily in both eyes. Printed list given to patient.    Patient notes he will be starting allergy shots.     3. Pinguecula of both eyes  Recommend UV protection to prevent worsening.     Return if symptoms worsen or fail to improve.     Melba Pearson, OD, FAAO, ABCMO, Diplomate ABO